# Patient Record
Sex: MALE | Race: WHITE | NOT HISPANIC OR LATINO | Employment: OTHER | URBAN - METROPOLITAN AREA
[De-identification: names, ages, dates, MRNs, and addresses within clinical notes are randomized per-mention and may not be internally consistent; named-entity substitution may affect disease eponyms.]

---

## 2018-10-31 ENCOUNTER — HOSPITAL ENCOUNTER (EMERGENCY)
Facility: HOSPITAL | Age: 83
Discharge: HOME | End: 2018-10-31
Attending: EMERGENCY MEDICINE | Admitting: EMERGENCY MEDICINE

## 2018-10-31 ENCOUNTER — APPOINTMENT (OUTPATIENT)
Dept: GENERAL RADIOLOGY | Facility: HOSPITAL | Age: 83
End: 2018-10-31

## 2018-10-31 VITALS
HEART RATE: 57 BPM | RESPIRATION RATE: 16 BRPM | HEIGHT: 68 IN | DIASTOLIC BLOOD PRESSURE: 70 MMHG | TEMPERATURE: 98 F | OXYGEN SATURATION: 96 % | BODY MASS INDEX: 21.67 KG/M2 | WEIGHT: 143 LBS | SYSTOLIC BLOOD PRESSURE: 152 MMHG

## 2018-10-31 DIAGNOSIS — I10 ELEVATED BLOOD PRESSURE READING WITH DIAGNOSIS OF HYPERTENSION: ICD-10-CM

## 2018-10-31 DIAGNOSIS — S50.311A ABRASION OF RIGHT ELBOW, INITIAL ENCOUNTER: ICD-10-CM

## 2018-10-31 DIAGNOSIS — S70.01XA CONTUSION OF RIGHT HIP, INITIAL ENCOUNTER: ICD-10-CM
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
## 2018-10-31 DIAGNOSIS — S40.011A CONTUSION OF RIGHT SHOULDER, INITIAL ENCOUNTER: ICD-10-CM
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
## 2018-10-31 PROCEDURE — 73552 X-RAY EXAM OF FEMUR 2/>: CPT

## 2018-10-31 PROCEDURE — 99283 EMERGENCY DEPT VISIT LOW MDM: CPT

## 2018-10-31 RX ORDER — LISINOPRIL 5 MG/1
5 TABLET ORAL DAILY
Status: ON HOLD | COMMUNITY
End: 2020-12-16

## 2018-10-31 RX ORDER — TAMSULOSIN HYDROCHLORIDE 0.4 MG/1
1 CAPSULE ORAL NIGHTLY
COMMUNITY

## 2018-10-31 RX ORDER — BACITRACIN, NEOMYCIN, POLYMYXIN B 400; 3.5; 5 [USP'U]/G; MG/G; [USP'U]/G
1 OINTMENT TOPICAL ONCE
Status: COMPLETED | OUTPATIENT
Start: 2018-10-31 | End: 2018-10-31

## 2018-10-31 RX ORDER — CETIRIZINE HYDROCHLORIDE 10 MG/1
10 TABLET ORAL DAILY PRN
COMMUNITY

## 2018-10-31 RX ORDER — ACETAMINOPHEN 500 MG
1000 TABLET ORAL ONCE
Status: COMPLETED | OUTPATIENT
Start: 2018-10-31 | End: 2018-10-31

## 2018-10-31 RX ADMIN — ACETAMINOPHEN 1000 MG: 500 TABLET, FILM COATED ORAL at 09:12

## 2018-10-31 RX ADMIN — BACITRACIN, NEOMYCIN, POLYMYXIN B 1 G: 400; 3.5; 5 OINTMENT TOPICAL at 10:42

## 2020-12-16 ENCOUNTER — APPOINTMENT (OUTPATIENT)
Dept: CT IMAGING | Facility: HOSPITAL | Age: 85
End: 2020-12-16

## 2020-12-16 ENCOUNTER — HOSPITAL ENCOUNTER (INPATIENT)
Facility: HOSPITAL | Age: 85
LOS: 13 days | Discharge: NURSING, REHAB OR LONG-TERM CARE | End: 2020-12-30
Attending: EMERGENCY MEDICINE | Admitting: INTERNAL MEDICINE

## 2020-12-16 ENCOUNTER — APPOINTMENT (OUTPATIENT)
Dept: GENERAL RADIOLOGY | Facility: HOSPITAL | Age: 85
End: 2020-12-16

## 2020-12-16 DIAGNOSIS — G47.01 INSOMNIA DUE TO MEDICAL CONDITION: ICD-10-CM

## 2020-12-16 DIAGNOSIS — N39.0 URINARY TRACT INFECTION WITHOUT HEMATURIA, SITE UNSPECIFIED: ICD-10-CM

## 2020-12-16 DIAGNOSIS — R07.9 CHEST PAIN, UNSPECIFIED TYPE: Primary | ICD-10-CM

## 2020-12-16 LAB
ALBUMIN SERPL-MCNC: 4.2 G/DL (ref 3.5–5.2)
ALBUMIN/GLOB SERPL: 1.7 G/DL
ALP SERPL-CCNC: 95 U/L (ref 39–117)
ALT SERPL W P-5'-P-CCNC: 21 U/L (ref 1–41)
ANION GAP SERPL CALCULATED.3IONS-SCNC: 10 MMOL/L (ref 5–15)
AST SERPL-CCNC: 26 U/L (ref 1–40)
BACTERIA UR QL AUTO: ABNORMAL /HPF
BASOPHILS # BLD AUTO: 0.04 10*3/MM3 (ref 0–0.2)
BASOPHILS NFR BLD AUTO: 0.5 % (ref 0–1.5)
BILIRUB SERPL-MCNC: 0.3 MG/DL (ref 0–1.2)
BILIRUB UR QL STRIP: NEGATIVE
BUN SERPL-MCNC: 31 MG/DL (ref 8–23)
BUN/CREAT SERPL: 27.7 (ref 7–25)
CALCIUM SPEC-SCNC: 9.6 MG/DL (ref 8.2–9.6)
CHLORIDE SERPL-SCNC: 104 MMOL/L (ref 98–107)
CLARITY UR: ABNORMAL
CO2 SERPL-SCNC: 29 MMOL/L (ref 22–29)
COLOR UR: YELLOW
CREAT SERPL-MCNC: 1.12 MG/DL (ref 0.76–1.27)
DEPRECATED RDW RBC AUTO: 47.8 FL (ref 37–54)
EOSINOPHIL # BLD AUTO: 0.16 10*3/MM3 (ref 0–0.4)
EOSINOPHIL NFR BLD AUTO: 1.8 % (ref 0.3–6.2)
ERYTHROCYTE [DISTWIDTH] IN BLOOD BY AUTOMATED COUNT: 13.3 % (ref 12.3–15.4)
GFR SERPL CREATININE-BSD FRML MDRD: 61 ML/MIN/1.73
GLOBULIN UR ELPH-MCNC: 2.5 GM/DL
GLUCOSE SERPL-MCNC: 137 MG/DL (ref 65–99)
GLUCOSE UR STRIP-MCNC: NEGATIVE MG/DL
HCT VFR BLD AUTO: 37 % (ref 37.5–51)
HGB BLD-MCNC: 11.8 G/DL (ref 13–17.7)
HGB UR QL STRIP.AUTO: NEGATIVE
HOLD SPECIMEN: NORMAL
HOLD SPECIMEN: NORMAL
HYALINE CASTS UR QL AUTO: ABNORMAL /LPF
IMM GRANULOCYTES # BLD AUTO: 0.02 10*3/MM3 (ref 0–0.05)
IMM GRANULOCYTES NFR BLD AUTO: 0.2 % (ref 0–0.5)
KETONES UR QL STRIP: NEGATIVE
LEUKOCYTE ESTERASE UR QL STRIP.AUTO: ABNORMAL
LIPASE SERPL-CCNC: 34 U/L (ref 13–60)
LYMPHOCYTES # BLD AUTO: 1.86 10*3/MM3 (ref 0.7–3.1)
LYMPHOCYTES NFR BLD AUTO: 21.1 % (ref 19.6–45.3)
MAGNESIUM SERPL-MCNC: 1.9 MG/DL (ref 1.7–2.3)
MCH RBC QN AUTO: 31.1 PG (ref 26.6–33)
MCHC RBC AUTO-ENTMCNC: 31.9 G/DL (ref 31.5–35.7)
MCV RBC AUTO: 97.4 FL (ref 79–97)
MONOCYTES # BLD AUTO: 0.72 10*3/MM3 (ref 0.1–0.9)
MONOCYTES NFR BLD AUTO: 8.2 % (ref 5–12)
NEUTROPHILS NFR BLD AUTO: 6.01 10*3/MM3 (ref 1.7–7)
NEUTROPHILS NFR BLD AUTO: 68.2 % (ref 42.7–76)
NITRITE UR QL STRIP: NEGATIVE
NRBC BLD AUTO-RTO: 0 /100 WBC (ref 0–0.2)
NT-PROBNP SERPL-MCNC: 754.4 PG/ML (ref 0–1800)
PH UR STRIP.AUTO: 8 [PH] (ref 5–8)
PLATELET # BLD AUTO: 233 10*3/MM3 (ref 140–450)
PMV BLD AUTO: 9.8 FL (ref 6–12)
POTASSIUM SERPL-SCNC: 4.2 MMOL/L (ref 3.5–5.2)
PROT SERPL-MCNC: 6.7 G/DL (ref 6–8.5)
PROT UR QL STRIP: ABNORMAL
QT INTERVAL: 442 MS
QT INTERVAL: 456 MS
QTC INTERVAL: 480 MS
QTC INTERVAL: 481 MS
RBC # BLD AUTO: 3.8 10*6/MM3 (ref 4.14–5.8)
RBC # UR: ABNORMAL /HPF
REF LAB TEST METHOD: ABNORMAL
SODIUM SERPL-SCNC: 143 MMOL/L (ref 136–145)
SP GR UR STRIP: 1.02 (ref 1–1.03)
SQUAMOUS #/AREA URNS HPF: ABNORMAL /HPF
TROPONIN T SERPL-MCNC: 0.08 NG/ML (ref 0–0.03)
TROPONIN T SERPL-MCNC: 0.09 NG/ML (ref 0–0.03)
UROBILINOGEN UR QL STRIP: ABNORMAL
WBC # BLD AUTO: 8.81 10*3/MM3 (ref 3.4–10.8)
WBC UR QL AUTO: ABNORMAL /HPF
WHOLE BLOOD HOLD SPECIMEN: NORMAL
WHOLE BLOOD HOLD SPECIMEN: NORMAL

## 2020-12-16 PROCEDURE — G0378 HOSPITAL OBSERVATION PER HR: HCPCS

## 2020-12-16 PROCEDURE — 99220 PR INITIAL OBSERVATION CARE/DAY 70 MINUTES: CPT | Performed by: INTERNAL MEDICINE

## 2020-12-16 PROCEDURE — 87186 SC STD MICRODIL/AGAR DIL: CPT | Performed by: NURSE PRACTITIONER

## 2020-12-16 PROCEDURE — 99285 EMERGENCY DEPT VISIT HI MDM: CPT

## 2020-12-16 PROCEDURE — 85025 COMPLETE CBC W/AUTO DIFF WBC: CPT | Performed by: EMERGENCY MEDICINE

## 2020-12-16 PROCEDURE — 80053 COMPREHEN METABOLIC PANEL: CPT | Performed by: EMERGENCY MEDICINE

## 2020-12-16 PROCEDURE — 83690 ASSAY OF LIPASE: CPT | Performed by: EMERGENCY MEDICINE

## 2020-12-16 PROCEDURE — 84484 ASSAY OF TROPONIN QUANT: CPT | Performed by: EMERGENCY MEDICINE

## 2020-12-16 PROCEDURE — 93005 ELECTROCARDIOGRAM TRACING: CPT

## 2020-12-16 PROCEDURE — 51798 US URINE CAPACITY MEASURE: CPT

## 2020-12-16 PROCEDURE — 83036 HEMOGLOBIN GLYCOSYLATED A1C: CPT | Performed by: NURSE PRACTITIONER

## 2020-12-16 PROCEDURE — 81001 URINALYSIS AUTO W/SCOPE: CPT | Performed by: NURSE PRACTITIONER

## 2020-12-16 PROCEDURE — 83880 ASSAY OF NATRIURETIC PEPTIDE: CPT | Performed by: EMERGENCY MEDICINE

## 2020-12-16 PROCEDURE — 84443 ASSAY THYROID STIM HORMONE: CPT | Performed by: NURSE PRACTITIONER

## 2020-12-16 PROCEDURE — 71045 X-RAY EXAM CHEST 1 VIEW: CPT

## 2020-12-16 PROCEDURE — 83735 ASSAY OF MAGNESIUM: CPT | Performed by: NURSE PRACTITIONER

## 2020-12-16 PROCEDURE — 87086 URINE CULTURE/COLONY COUNT: CPT | Performed by: NURSE PRACTITIONER

## 2020-12-16 PROCEDURE — 25010000002 CEFTRIAXONE PER 250 MG: Performed by: NURSE PRACTITIONER

## 2020-12-16 PROCEDURE — U0004 COV-19 TEST NON-CDC HGH THRU: HCPCS | Performed by: INTERNAL MEDICINE

## 2020-12-16 PROCEDURE — 0 IOPAMIDOL PER 1 ML: Performed by: EMERGENCY MEDICINE

## 2020-12-16 PROCEDURE — 93005 ELECTROCARDIOGRAM TRACING: CPT | Performed by: EMERGENCY MEDICINE

## 2020-12-16 PROCEDURE — 71275 CT ANGIOGRAPHY CHEST: CPT

## 2020-12-16 RX ORDER — ACETAMINOPHEN 160 MG/5ML
650 SOLUTION ORAL EVERY 4 HOURS PRN
Status: DISCONTINUED | OUTPATIENT
Start: 2020-12-16 | End: 2020-12-30 | Stop reason: HOSPADM

## 2020-12-16 RX ORDER — DOCUSATE SODIUM 100 MG/1
100 CAPSULE, LIQUID FILLED ORAL 2 TIMES DAILY PRN
Status: DISCONTINUED | OUTPATIENT
Start: 2020-12-16 | End: 2020-12-30 | Stop reason: HOSPADM

## 2020-12-16 RX ORDER — CETIRIZINE HYDROCHLORIDE 10 MG/1
10 TABLET ORAL DAILY PRN
Status: DISCONTINUED | OUTPATIENT
Start: 2020-12-16 | End: 2020-12-30 | Stop reason: HOSPADM

## 2020-12-16 RX ORDER — DEXTROSE MONOHYDRATE 25 G/50ML
25 INJECTION, SOLUTION INTRAVENOUS
Status: DISCONTINUED | OUTPATIENT
Start: 2020-12-16 | End: 2020-12-30 | Stop reason: HOSPADM

## 2020-12-16 RX ORDER — HEPARIN SODIUM 5000 [USP'U]/ML
5000 INJECTION, SOLUTION INTRAVENOUS; SUBCUTANEOUS EVERY 8 HOURS SCHEDULED
Status: DISCONTINUED | OUTPATIENT
Start: 2020-12-17 | End: 2020-12-27

## 2020-12-16 RX ORDER — METFORMIN HYDROCHLORIDE 500 MG/1
500 TABLET, EXTENDED RELEASE ORAL
COMMUNITY

## 2020-12-16 RX ORDER — TAMSULOSIN HYDROCHLORIDE 0.4 MG/1
0.4 CAPSULE ORAL NIGHTLY
Status: DISCONTINUED | OUTPATIENT
Start: 2020-12-16 | End: 2020-12-27

## 2020-12-16 RX ORDER — NICOTINE POLACRILEX 4 MG
15 LOZENGE BUCCAL
Status: DISCONTINUED | OUTPATIENT
Start: 2020-12-16 | End: 2020-12-30 | Stop reason: HOSPADM

## 2020-12-16 RX ORDER — SODIUM CHLORIDE 0.9 % (FLUSH) 0.9 %
10 SYRINGE (ML) INJECTION AS NEEDED
Status: DISCONTINUED | OUTPATIENT
Start: 2020-12-16 | End: 2020-12-30 | Stop reason: HOSPADM

## 2020-12-16 RX ORDER — ACETAMINOPHEN 650 MG/1
650 SUPPOSITORY RECTAL EVERY 4 HOURS PRN
Status: DISCONTINUED | OUTPATIENT
Start: 2020-12-16 | End: 2020-12-30 | Stop reason: HOSPADM

## 2020-12-16 RX ORDER — ACETAMINOPHEN 325 MG/1
650 TABLET ORAL EVERY 4 HOURS PRN
Status: DISCONTINUED | OUTPATIENT
Start: 2020-12-16 | End: 2020-12-30 | Stop reason: HOSPADM

## 2020-12-16 RX ORDER — LISINOPRIL 5 MG/1
5 TABLET ORAL DAILY
Status: DISCONTINUED | OUTPATIENT
Start: 2020-12-16 | End: 2020-12-30 | Stop reason: HOSPADM

## 2020-12-16 RX ORDER — ASPIRIN 81 MG/1
324 TABLET, CHEWABLE ORAL ONCE
Status: COMPLETED | OUTPATIENT
Start: 2020-12-16 | End: 2020-12-16

## 2020-12-16 RX ORDER — SODIUM CHLORIDE 0.9 % (FLUSH) 0.9 %
10 SYRINGE (ML) INJECTION EVERY 12 HOURS SCHEDULED
Status: DISCONTINUED | OUTPATIENT
Start: 2020-12-17 | End: 2020-12-30 | Stop reason: HOSPADM

## 2020-12-16 RX ADMIN — SODIUM CHLORIDE 500 ML: 9 INJECTION, SOLUTION INTRAVENOUS at 14:54

## 2020-12-16 RX ADMIN — TAMSULOSIN HYDROCHLORIDE 0.4 MG: 0.4 CAPSULE ORAL at 23:01

## 2020-12-16 RX ADMIN — CETIRIZINE HYDROCHLORIDE 10 MG: 10 TABLET, FILM COATED ORAL at 18:55

## 2020-12-16 RX ADMIN — LISINOPRIL 5 MG: 5 TABLET ORAL at 18:55

## 2020-12-16 RX ADMIN — ASPIRIN 81 MG CHEWABLE TABLET 324 MG: 81 TABLET CHEWABLE at 14:56

## 2020-12-16 RX ADMIN — IOPAMIDOL 60 ML: 755 INJECTION, SOLUTION INTRAVENOUS at 16:13

## 2020-12-16 RX ADMIN — SODIUM CHLORIDE 1 G: 900 INJECTION INTRAVENOUS at 15:26

## 2020-12-16 RX ADMIN — SODIUM CHLORIDE, PRESERVATIVE FREE 10 ML: 5 INJECTION INTRAVENOUS at 23:46

## 2020-12-17 ENCOUNTER — APPOINTMENT (OUTPATIENT)
Dept: CARDIOLOGY | Facility: HOSPITAL | Age: 85
End: 2020-12-17

## 2020-12-17 PROBLEM — I44.7 LBBB (LEFT BUNDLE BRANCH BLOCK): Status: ACTIVE | Noted: 2020-12-17

## 2020-12-17 PROBLEM — I21.4 NSTEMI (NON-ST ELEVATED MYOCARDIAL INFARCTION) (HCC): Status: ACTIVE | Noted: 2020-12-17

## 2020-12-17 PROBLEM — G93.41 METABOLIC ENCEPHALOPATHY: Status: ACTIVE | Noted: 2020-12-17

## 2020-12-17 LAB
ALBUMIN SERPL-MCNC: 3.6 G/DL (ref 3.5–5.2)
ALBUMIN/GLOB SERPL: 1.2 G/DL
ALP SERPL-CCNC: 89 U/L (ref 39–117)
ALT SERPL W P-5'-P-CCNC: 21 U/L (ref 1–41)
ANION GAP SERPL CALCULATED.3IONS-SCNC: 13 MMOL/L (ref 5–15)
ASCENDING AORTA: 3.3 CM
AST SERPL-CCNC: 28 U/L (ref 1–40)
BASOPHILS # BLD AUTO: 0.04 10*3/MM3 (ref 0–0.2)
BASOPHILS NFR BLD AUTO: 0.4 % (ref 0–1.5)
BH CV ECHO MEAS - AI DEC SLOPE: 165.9 CM/SEC^2
BH CV ECHO MEAS - AI MAX PG: 71.7 MMHG
BH CV ECHO MEAS - AI MAX VEL: 423.5 CM/SEC
BH CV ECHO MEAS - AI P1/2T: 747.8 MSEC
BH CV ECHO MEAS - AO MAX PG (FULL): 4.9 MMHG
BH CV ECHO MEAS - AO MAX PG: 7 MMHG
BH CV ECHO MEAS - AO MEAN PG (FULL): 3.2 MMHG
BH CV ECHO MEAS - AO MEAN PG: 4.2 MMHG
BH CV ECHO MEAS - AO ROOT AREA (BSA CORRECTED): 1.9
BH CV ECHO MEAS - AO ROOT AREA: 8.8 CM^2
BH CV ECHO MEAS - AO ROOT DIAM: 3.4 CM
BH CV ECHO MEAS - AO V2 MAX: 133.2 CM/SEC
BH CV ECHO MEAS - AO V2 MEAN: 97.3 CM/SEC
BH CV ECHO MEAS - AO V2 VTI: 31.2 CM
BH CV ECHO MEAS - ASC AORTA: 3.3 CM
BH CV ECHO MEAS - AVA(I,A): 1.4 CM^2
BH CV ECHO MEAS - AVA(I,D): 1.4 CM^2
BH CV ECHO MEAS - AVA(V,A): 1.7 CM^2
BH CV ECHO MEAS - AVA(V,D): 1.7 CM^2
BH CV ECHO MEAS - BSA(HAYCOCK): 1.8 M^2
BH CV ECHO MEAS - BSA: 1.8 M^2
BH CV ECHO MEAS - BZI_BMI: 21.7 KILOGRAMS/M^2
BH CV ECHO MEAS - BZI_METRIC_HEIGHT: 172.7 CM
BH CV ECHO MEAS - BZI_METRIC_WEIGHT: 64.9 KG
BH CV ECHO MEAS - EDV(CUBED): 82.4 ML
BH CV ECHO MEAS - EDV(MOD-SP2): 72 ML
BH CV ECHO MEAS - EDV(MOD-SP4): 99.7 ML
BH CV ECHO MEAS - EDV(TEICH): 85.4 ML
BH CV ECHO MEAS - EF(CUBED): 59.8 %
BH CV ECHO MEAS - EF(MOD-BP): 49.7 %
BH CV ECHO MEAS - EF(MOD-SP2): 51.7 %
BH CV ECHO MEAS - EF(MOD-SP4): 51.4 %
BH CV ECHO MEAS - EF(TEICH): 51.6 %
BH CV ECHO MEAS - ESV(CUBED): 33.1 ML
BH CV ECHO MEAS - ESV(MOD-SP2): 34.8 ML
BH CV ECHO MEAS - ESV(MOD-SP4): 48.5 ML
BH CV ECHO MEAS - ESV(TEICH): 41.3 ML
BH CV ECHO MEAS - FS: 26.2 %
BH CV ECHO MEAS - IVS/LVPW: 0.8
BH CV ECHO MEAS - IVSD: 1 CM
BH CV ECHO MEAS - LA DIMENSION: 2.9 CM
BH CV ECHO MEAS - LA/AO: 0.86
BH CV ECHO MEAS - LAD MAJOR: 6.3 CM
BH CV ECHO MEAS - LAT PEAK E' VEL: 6.2 CM/SEC
BH CV ECHO MEAS - LATERAL E/E' RATIO: 13.3
BH CV ECHO MEAS - LV DIASTOLIC VOL/BSA (35-75): 56.3 ML/M^2
BH CV ECHO MEAS - LV IVRT: 0.1 SEC
BH CV ECHO MEAS - LV MASS(C)D: 176.5 GRAMS
BH CV ECHO MEAS - LV MASS(C)DI: 99.6 GRAMS/M^2
BH CV ECHO MEAS - LV MAX PG: 2.1 MMHG
BH CV ECHO MEAS - LV MEAN PG: 1 MMHG
BH CV ECHO MEAS - LV SYSTOLIC VOL/BSA (12-30): 27.4 ML/M^2
BH CV ECHO MEAS - LV V1 MAX: 72.2 CM/SEC
BH CV ECHO MEAS - LV V1 MEAN: 46.1 CM/SEC
BH CV ECHO MEAS - LV V1 VTI: 14.6 CM
BH CV ECHO MEAS - LVIDD: 4.4 CM
BH CV ECHO MEAS - LVIDS: 3.2 CM
BH CV ECHO MEAS - LVLD AP2: 7.2 CM
BH CV ECHO MEAS - LVLD AP4: 8.2 CM
BH CV ECHO MEAS - LVLS AP2: 5.9 CM
BH CV ECHO MEAS - LVLS AP4: 7.3 CM
BH CV ECHO MEAS - LVOT AREA (M): 3.1 CM^2
BH CV ECHO MEAS - LVOT AREA: 3.1 CM^2
BH CV ECHO MEAS - LVOT DIAM: 2 CM
BH CV ECHO MEAS - LVPWD: 1.3 CM
BH CV ECHO MEAS - MED PEAK E' VEL: 4.5 CM/SEC
BH CV ECHO MEAS - MEDIAL E/E' RATIO: 18.5
BH CV ECHO MEAS - MV A MAX VEL: 107.8 CM/SEC
BH CV ECHO MEAS - MV DEC SLOPE: 367.1 CM/SEC^2
BH CV ECHO MEAS - MV DEC TIME: 0.2 SEC
BH CV ECHO MEAS - MV E MAX VEL: 82.6 CM/SEC
BH CV ECHO MEAS - MV E/A: 0.77
BH CV ECHO MEAS - MV P1/2T MAX VEL: 80.1 CM/SEC
BH CV ECHO MEAS - MV P1/2T: 63.9 MSEC
BH CV ECHO MEAS - MVA P1/2T LCG: 2.7 CM^2
BH CV ECHO MEAS - MVA(P1/2T): 3.4 CM^2
BH CV ECHO MEAS - PA ACC TIME: 0.13 SEC
BH CV ECHO MEAS - PA MAX PG: 3.3 MMHG
BH CV ECHO MEAS - PA PR(ACCEL): 22 MMHG
BH CV ECHO MEAS - PA V2 MAX: 91.1 CM/SEC
BH CV ECHO MEAS - PI END-D VEL: 120.8 CM/SEC
BH CV ECHO MEAS - RAP SYSTOLE: 8 MMHG
BH CV ECHO MEAS - RVSP: 33 MMHG
BH CV ECHO MEAS - SI(AO): 155.1 ML/M^2
BH CV ECHO MEAS - SI(CUBED): 27.8 ML/M^2
BH CV ECHO MEAS - SI(LVOT): 25.4 ML/M^2
BH CV ECHO MEAS - SI(MOD-SP2): 21 ML/M^2
BH CV ECHO MEAS - SI(MOD-SP4): 28.9 ML/M^2
BH CV ECHO MEAS - SI(TEICH): 24.9 ML/M^2
BH CV ECHO MEAS - SV(AO): 274.8 ML
BH CV ECHO MEAS - SV(CUBED): 49.3 ML
BH CV ECHO MEAS - SV(LVOT): 45 ML
BH CV ECHO MEAS - SV(MOD-SP2): 37.2 ML
BH CV ECHO MEAS - SV(MOD-SP4): 51.2 ML
BH CV ECHO MEAS - SV(TEICH): 44.1 ML
BH CV ECHO MEAS - TAPSE (>1.6): 2.3 CM
BH CV ECHO MEAS - TR MAX PG: 25 MMHG
BH CV ECHO MEAS - TR MAX VEL: 248 CM/SEC
BH CV ECHO MEASUREMENTS AVERAGE E/E' RATIO: 15.44
BH CV VAS BP LEFT ARM: NORMAL MMHG
BH CV XLRA - RV BASE: 3.7 CM
BH CV XLRA - RV LENGTH: 6.6 CM
BH CV XLRA - RV MID: 2.4 CM
BH CV XLRA - TDI S': 11.6 CM/SEC
BILIRUB SERPL-MCNC: 0.4 MG/DL (ref 0–1.2)
BUN SERPL-MCNC: 25 MG/DL (ref 8–23)
BUN/CREAT SERPL: 24.5 (ref 7–25)
CALCIUM SPEC-SCNC: 9.1 MG/DL (ref 8.2–9.6)
CHLORIDE SERPL-SCNC: 107 MMOL/L (ref 98–107)
CHOLEST SERPL-MCNC: 174 MG/DL (ref 0–200)
CO2 SERPL-SCNC: 21 MMOL/L (ref 22–29)
CREAT SERPL-MCNC: 1.02 MG/DL (ref 0.76–1.27)
DEPRECATED RDW RBC AUTO: 48 FL (ref 37–54)
EOSINOPHIL # BLD AUTO: 0.12 10*3/MM3 (ref 0–0.4)
EOSINOPHIL NFR BLD AUTO: 1.3 % (ref 0.3–6.2)
ERYTHROCYTE [DISTWIDTH] IN BLOOD BY AUTOMATED COUNT: 13.2 % (ref 12.3–15.4)
GFR SERPL CREATININE-BSD FRML MDRD: 68 ML/MIN/1.73
GLOBULIN UR ELPH-MCNC: 2.9 GM/DL
GLUCOSE BLDC GLUCOMTR-MCNC: 102 MG/DL (ref 70–130)
GLUCOSE BLDC GLUCOMTR-MCNC: 109 MG/DL (ref 70–130)
GLUCOSE BLDC GLUCOMTR-MCNC: 113 MG/DL (ref 70–130)
GLUCOSE BLDC GLUCOMTR-MCNC: 116 MG/DL (ref 70–130)
GLUCOSE SERPL-MCNC: 112 MG/DL (ref 65–99)
HBA1C MFR BLD: 6.2 % (ref 4.8–5.6)
HCT VFR BLD AUTO: 39.5 % (ref 37.5–51)
HDLC SERPL-MCNC: 66 MG/DL (ref 40–60)
HGB BLD-MCNC: 12 G/DL (ref 13–17.7)
IMM GRANULOCYTES # BLD AUTO: 0.03 10*3/MM3 (ref 0–0.05)
IMM GRANULOCYTES NFR BLD AUTO: 0.3 % (ref 0–0.5)
LDLC SERPL CALC-MCNC: 96 MG/DL (ref 0–100)
LDLC/HDLC SERPL: 1.45 {RATIO}
LEFT ATRIUM VOLUME INDEX: 24 ML/M^2
LEFT ATRIUM VOLUME: 42.5 ML
LYMPHOCYTES # BLD AUTO: 2.03 10*3/MM3 (ref 0.7–3.1)
LYMPHOCYTES NFR BLD AUTO: 22.5 % (ref 19.6–45.3)
MCH RBC QN AUTO: 29.9 PG (ref 26.6–33)
MCHC RBC AUTO-ENTMCNC: 30.4 G/DL (ref 31.5–35.7)
MCV RBC AUTO: 98.5 FL (ref 79–97)
MONOCYTES # BLD AUTO: 0.84 10*3/MM3 (ref 0.1–0.9)
MONOCYTES NFR BLD AUTO: 9.3 % (ref 5–12)
NEUTROPHILS NFR BLD AUTO: 5.96 10*3/MM3 (ref 1.7–7)
NEUTROPHILS NFR BLD AUTO: 66.2 % (ref 42.7–76)
NRBC BLD AUTO-RTO: 0 /100 WBC (ref 0–0.2)
PLATELET # BLD AUTO: 213 10*3/MM3 (ref 140–450)
PMV BLD AUTO: 9.9 FL (ref 6–12)
POTASSIUM SERPL-SCNC: 4 MMOL/L (ref 3.5–5.2)
PROT SERPL-MCNC: 6.5 G/DL (ref 6–8.5)
RBC # BLD AUTO: 4.01 10*6/MM3 (ref 4.14–5.8)
SARS-COV-2 RNA RESP QL NAA+PROBE: NOT DETECTED
SODIUM SERPL-SCNC: 141 MMOL/L (ref 136–145)
T4 FREE SERPL-MCNC: 0.98 NG/DL (ref 0.93–1.7)
TRIGL SERPL-MCNC: 60 MG/DL (ref 0–150)
TROPONIN T SERPL-MCNC: 0.08 NG/ML (ref 0–0.03)
TROPONIN T SERPL-MCNC: 0.1 NG/ML (ref 0–0.03)
TSH SERPL DL<=0.05 MIU/L-ACNC: 5.88 UIU/ML (ref 0.27–4.2)
TSH SERPL DL<=0.05 MIU/L-ACNC: 5.98 UIU/ML (ref 0.27–4.2)
VLDLC SERPL-MCNC: 12 MG/DL (ref 5–40)
WBC # BLD AUTO: 9.02 10*3/MM3 (ref 3.4–10.8)

## 2020-12-17 PROCEDURE — 82962 GLUCOSE BLOOD TEST: CPT

## 2020-12-17 PROCEDURE — 93306 TTE W/DOPPLER COMPLETE: CPT

## 2020-12-17 PROCEDURE — 93005 ELECTROCARDIOGRAM TRACING: CPT | Performed by: FAMILY MEDICINE

## 2020-12-17 PROCEDURE — 80061 LIPID PANEL: CPT | Performed by: NURSE PRACTITIONER

## 2020-12-17 PROCEDURE — 80053 COMPREHEN METABOLIC PANEL: CPT | Performed by: NURSE PRACTITIONER

## 2020-12-17 PROCEDURE — 84484 ASSAY OF TROPONIN QUANT: CPT | Performed by: NURSE PRACTITIONER

## 2020-12-17 PROCEDURE — 25010000002 LORAZEPAM PER 2 MG: Performed by: FAMILY MEDICINE

## 2020-12-17 PROCEDURE — 25010000002 HEPARIN (PORCINE) PER 1000 UNITS: Performed by: NURSE PRACTITIONER

## 2020-12-17 PROCEDURE — 25010000002 CEFTRIAXONE PER 250 MG: Performed by: NURSE PRACTITIONER

## 2020-12-17 PROCEDURE — 85025 COMPLETE CBC W/AUTO DIFF WBC: CPT | Performed by: NURSE PRACTITIONER

## 2020-12-17 PROCEDURE — 97166 OT EVAL MOD COMPLEX 45 MIN: CPT

## 2020-12-17 PROCEDURE — 93306 TTE W/DOPPLER COMPLETE: CPT | Performed by: INTERNAL MEDICINE

## 2020-12-17 PROCEDURE — 97162 PT EVAL MOD COMPLEX 30 MIN: CPT

## 2020-12-17 PROCEDURE — 99233 SBSQ HOSP IP/OBS HIGH 50: CPT | Performed by: FAMILY MEDICINE

## 2020-12-17 PROCEDURE — 84443 ASSAY THYROID STIM HORMONE: CPT | Performed by: NURSE PRACTITIONER

## 2020-12-17 PROCEDURE — 84439 ASSAY OF FREE THYROXINE: CPT | Performed by: NURSE PRACTITIONER

## 2020-12-17 RX ORDER — QUETIAPINE FUMARATE 25 MG/1
25 TABLET, FILM COATED ORAL ONCE AS NEEDED
Status: COMPLETED | OUTPATIENT
Start: 2020-12-17 | End: 2020-12-17

## 2020-12-17 RX ORDER — LORAZEPAM 2 MG/ML
1 INJECTION INTRAMUSCULAR EVERY 4 HOURS PRN
Status: DISCONTINUED | OUTPATIENT
Start: 2020-12-17 | End: 2020-12-21

## 2020-12-17 RX ORDER — NITROGLYCERIN 0.4 MG/1
0.4 TABLET SUBLINGUAL
Status: DISCONTINUED | OUTPATIENT
Start: 2020-12-17 | End: 2020-12-30 | Stop reason: HOSPADM

## 2020-12-17 RX ORDER — LORAZEPAM 2 MG/ML
2 INJECTION INTRAMUSCULAR ONCE AS NEEDED
Status: COMPLETED | OUTPATIENT
Start: 2020-12-17 | End: 2020-12-17

## 2020-12-17 RX ORDER — ATORVASTATIN CALCIUM 20 MG/1
20 TABLET, FILM COATED ORAL NIGHTLY
Status: DISCONTINUED | OUTPATIENT
Start: 2020-12-17 | End: 2020-12-30 | Stop reason: HOSPADM

## 2020-12-17 RX ADMIN — HEPARIN SODIUM 5000 UNITS: 5000 INJECTION INTRAVENOUS; SUBCUTANEOUS at 13:22

## 2020-12-17 RX ADMIN — LORAZEPAM 1 MG: 2 INJECTION INTRAMUSCULAR; INTRAVENOUS at 13:37

## 2020-12-17 RX ADMIN — SODIUM CHLORIDE, PRESERVATIVE FREE 10 ML: 5 INJECTION INTRAVENOUS at 21:16

## 2020-12-17 RX ADMIN — HEPARIN SODIUM 5000 UNITS: 5000 INJECTION INTRAVENOUS; SUBCUTANEOUS at 05:29

## 2020-12-17 RX ADMIN — LORAZEPAM 2 MG: 2 INJECTION INTRAMUSCULAR; INTRAVENOUS at 14:35

## 2020-12-17 RX ADMIN — ATORVASTATIN CALCIUM 20 MG: 20 TABLET, FILM COATED ORAL at 21:15

## 2020-12-17 RX ADMIN — CEFTRIAXONE 1 G: 1 INJECTION, POWDER, FOR SOLUTION INTRAMUSCULAR; INTRAVENOUS at 16:26

## 2020-12-17 RX ADMIN — LISINOPRIL 5 MG: 5 TABLET ORAL at 08:55

## 2020-12-17 RX ADMIN — TAMSULOSIN HYDROCHLORIDE 0.4 MG: 0.4 CAPSULE ORAL at 21:15

## 2020-12-17 RX ADMIN — METOPROLOL TARTRATE 12.5 MG: 25 TABLET, FILM COATED ORAL at 21:15

## 2020-12-17 RX ADMIN — SODIUM CHLORIDE, PRESERVATIVE FREE 10 ML: 5 INJECTION INTRAVENOUS at 08:55

## 2020-12-17 RX ADMIN — QUETIAPINE FUMARATE 25 MG: 25 TABLET ORAL at 00:53

## 2020-12-17 RX ADMIN — METOPROLOL TARTRATE 12.5 MG: 25 TABLET, FILM COATED ORAL at 13:21

## 2020-12-17 RX ADMIN — HEPARIN SODIUM 5000 UNITS: 5000 INJECTION INTRAVENOUS; SUBCUTANEOUS at 21:15

## 2020-12-18 PROBLEM — I50.23 ACUTE ON CHRONIC SYSTOLIC CHF (CONGESTIVE HEART FAILURE) (HCC): Status: ACTIVE | Noted: 2020-12-18

## 2020-12-18 LAB
ANION GAP SERPL CALCULATED.3IONS-SCNC: 11 MMOL/L (ref 5–15)
BASOPHILS # BLD AUTO: 0.04 10*3/MM3 (ref 0–0.2)
BASOPHILS NFR BLD AUTO: 0.5 % (ref 0–1.5)
BUN SERPL-MCNC: 22 MG/DL (ref 8–23)
BUN/CREAT SERPL: 23.7 (ref 7–25)
CALCIUM SPEC-SCNC: 8.6 MG/DL (ref 8.2–9.6)
CHLORIDE SERPL-SCNC: 106 MMOL/L (ref 98–107)
CO2 SERPL-SCNC: 24 MMOL/L (ref 22–29)
CREAT SERPL-MCNC: 0.93 MG/DL (ref 0.76–1.27)
DEPRECATED RDW RBC AUTO: 47.3 FL (ref 37–54)
EOSINOPHIL # BLD AUTO: 0.18 10*3/MM3 (ref 0–0.4)
EOSINOPHIL NFR BLD AUTO: 2.3 % (ref 0.3–6.2)
ERYTHROCYTE [DISTWIDTH] IN BLOOD BY AUTOMATED COUNT: 13.3 % (ref 12.3–15.4)
GFR SERPL CREATININE-BSD FRML MDRD: 75 ML/MIN/1.73
GLUCOSE BLDC GLUCOMTR-MCNC: 101 MG/DL (ref 70–130)
GLUCOSE BLDC GLUCOMTR-MCNC: 108 MG/DL (ref 70–130)
GLUCOSE BLDC GLUCOMTR-MCNC: 87 MG/DL (ref 70–130)
GLUCOSE BLDC GLUCOMTR-MCNC: 95 MG/DL (ref 70–130)
GLUCOSE SERPL-MCNC: 95 MG/DL (ref 65–99)
HCT VFR BLD AUTO: 38 % (ref 37.5–51)
HGB BLD-MCNC: 12 G/DL (ref 13–17.7)
IMM GRANULOCYTES # BLD AUTO: 0.02 10*3/MM3 (ref 0–0.05)
IMM GRANULOCYTES NFR BLD AUTO: 0.3 % (ref 0–0.5)
LYMPHOCYTES # BLD AUTO: 1.52 10*3/MM3 (ref 0.7–3.1)
LYMPHOCYTES NFR BLD AUTO: 19.4 % (ref 19.6–45.3)
MCH RBC QN AUTO: 30.6 PG (ref 26.6–33)
MCHC RBC AUTO-ENTMCNC: 31.6 G/DL (ref 31.5–35.7)
MCV RBC AUTO: 96.9 FL (ref 79–97)
MONOCYTES # BLD AUTO: 0.7 10*3/MM3 (ref 0.1–0.9)
MONOCYTES NFR BLD AUTO: 8.9 % (ref 5–12)
NEUTROPHILS NFR BLD AUTO: 5.38 10*3/MM3 (ref 1.7–7)
NEUTROPHILS NFR BLD AUTO: 68.6 % (ref 42.7–76)
NRBC BLD AUTO-RTO: 0 /100 WBC (ref 0–0.2)
PLATELET # BLD AUTO: 211 10*3/MM3 (ref 140–450)
PMV BLD AUTO: 9.6 FL (ref 6–12)
POTASSIUM SERPL-SCNC: 3.8 MMOL/L (ref 3.5–5.2)
QT INTERVAL: 452 MS
QTC INTERVAL: 508 MS
RBC # BLD AUTO: 3.92 10*6/MM3 (ref 4.14–5.8)
SODIUM SERPL-SCNC: 141 MMOL/L (ref 136–145)
WBC # BLD AUTO: 7.84 10*3/MM3 (ref 3.4–10.8)

## 2020-12-18 PROCEDURE — 99232 SBSQ HOSP IP/OBS MODERATE 35: CPT | Performed by: FAMILY MEDICINE

## 2020-12-18 PROCEDURE — 80048 BASIC METABOLIC PNL TOTAL CA: CPT | Performed by: FAMILY MEDICINE

## 2020-12-18 PROCEDURE — 85025 COMPLETE CBC W/AUTO DIFF WBC: CPT | Performed by: FAMILY MEDICINE

## 2020-12-18 PROCEDURE — 25010000002 PIPERACILLIN SOD-TAZOBACTAM PER 1 G: Performed by: FAMILY MEDICINE

## 2020-12-18 PROCEDURE — 25010000002 LORAZEPAM PER 2 MG: Performed by: FAMILY MEDICINE

## 2020-12-18 PROCEDURE — 82962 GLUCOSE BLOOD TEST: CPT

## 2020-12-18 PROCEDURE — 25010000002 HEPARIN (PORCINE) PER 1000 UNITS: Performed by: NURSE PRACTITIONER

## 2020-12-18 PROCEDURE — 93010 ELECTROCARDIOGRAM REPORT: CPT | Performed by: INTERNAL MEDICINE

## 2020-12-18 RX ORDER — QUETIAPINE FUMARATE 25 MG/1
25 TABLET, FILM COATED ORAL 2 TIMES DAILY PRN
Status: DISCONTINUED | OUTPATIENT
Start: 2020-12-18 | End: 2020-12-30 | Stop reason: HOSPADM

## 2020-12-18 RX ORDER — QUETIAPINE FUMARATE 25 MG/1
25 TABLET, FILM COATED ORAL NIGHTLY
Status: DISCONTINUED | OUTPATIENT
Start: 2020-12-18 | End: 2020-12-19

## 2020-12-18 RX ADMIN — TAMSULOSIN HYDROCHLORIDE 0.4 MG: 0.4 CAPSULE ORAL at 20:47

## 2020-12-18 RX ADMIN — HEPARIN SODIUM 5000 UNITS: 5000 INJECTION INTRAVENOUS; SUBCUTANEOUS at 13:20

## 2020-12-18 RX ADMIN — METOPROLOL TARTRATE 12.5 MG: 25 TABLET, FILM COATED ORAL at 10:19

## 2020-12-18 RX ADMIN — HEPARIN SODIUM 5000 UNITS: 5000 INJECTION INTRAVENOUS; SUBCUTANEOUS at 06:01

## 2020-12-18 RX ADMIN — LORAZEPAM 1 MG: 2 INJECTION INTRAMUSCULAR; INTRAVENOUS at 01:07

## 2020-12-18 RX ADMIN — HEPARIN SODIUM 5000 UNITS: 5000 INJECTION INTRAVENOUS; SUBCUTANEOUS at 20:47

## 2020-12-18 RX ADMIN — SODIUM CHLORIDE, PRESERVATIVE FREE 10 ML: 5 INJECTION INTRAVENOUS at 20:48

## 2020-12-18 RX ADMIN — QUETIAPINE FUMARATE 25 MG: 25 TABLET ORAL at 20:47

## 2020-12-18 RX ADMIN — ATORVASTATIN CALCIUM 20 MG: 20 TABLET, FILM COATED ORAL at 20:47

## 2020-12-18 RX ADMIN — LORAZEPAM 1 MG: 2 INJECTION INTRAMUSCULAR; INTRAVENOUS at 10:19

## 2020-12-18 RX ADMIN — SODIUM CHLORIDE, PRESERVATIVE FREE 10 ML: 5 INJECTION INTRAVENOUS at 10:20

## 2020-12-18 RX ADMIN — METOPROLOL TARTRATE 12.5 MG: 25 TABLET, FILM COATED ORAL at 20:47

## 2020-12-18 RX ADMIN — LORAZEPAM 1 MG: 2 INJECTION INTRAMUSCULAR; INTRAVENOUS at 06:01

## 2020-12-18 RX ADMIN — ACETAMINOPHEN 650 MG: 325 TABLET, FILM COATED ORAL at 20:47

## 2020-12-18 RX ADMIN — TAZOBACTAM SODIUM AND PIPERACILLIN SODIUM 3.38 G: 375; 3 INJECTION, SOLUTION INTRAVENOUS at 13:18

## 2020-12-18 RX ADMIN — TAZOBACTAM SODIUM AND PIPERACILLIN SODIUM 3.38 G: 375; 3 INJECTION, SOLUTION INTRAVENOUS at 18:41

## 2020-12-18 RX ADMIN — LORAZEPAM 1 MG: 2 INJECTION INTRAMUSCULAR; INTRAVENOUS at 21:05

## 2020-12-18 RX ADMIN — LISINOPRIL 5 MG: 5 TABLET ORAL at 10:19

## 2020-12-19 LAB
ANION GAP SERPL CALCULATED.3IONS-SCNC: 13 MMOL/L (ref 5–15)
BACTERIA SPEC AEROBE CULT: ABNORMAL
BASOPHILS # BLD AUTO: 0.04 10*3/MM3 (ref 0–0.2)
BASOPHILS NFR BLD AUTO: 0.6 % (ref 0–1.5)
BUN SERPL-MCNC: 22 MG/DL (ref 8–23)
BUN/CREAT SERPL: 22 (ref 7–25)
CALCIUM SPEC-SCNC: 8.9 MG/DL (ref 8.2–9.6)
CHLORIDE SERPL-SCNC: 105 MMOL/L (ref 98–107)
CO2 SERPL-SCNC: 24 MMOL/L (ref 22–29)
CREAT SERPL-MCNC: 1 MG/DL (ref 0.76–1.27)
DEPRECATED RDW RBC AUTO: 47.8 FL (ref 37–54)
EOSINOPHIL # BLD AUTO: 0.14 10*3/MM3 (ref 0–0.4)
EOSINOPHIL NFR BLD AUTO: 2.2 % (ref 0.3–6.2)
ERYTHROCYTE [DISTWIDTH] IN BLOOD BY AUTOMATED COUNT: 13.4 % (ref 12.3–15.4)
GFR SERPL CREATININE-BSD FRML MDRD: 69 ML/MIN/1.73
GLUCOSE BLDC GLUCOMTR-MCNC: 104 MG/DL (ref 70–130)
GLUCOSE BLDC GLUCOMTR-MCNC: 86 MG/DL (ref 70–130)
GLUCOSE BLDC GLUCOMTR-MCNC: 87 MG/DL (ref 70–130)
GLUCOSE BLDC GLUCOMTR-MCNC: 95 MG/DL (ref 70–130)
GLUCOSE SERPL-MCNC: 102 MG/DL (ref 65–99)
HCT VFR BLD AUTO: 39.5 % (ref 37.5–51)
HGB BLD-MCNC: 12.4 G/DL (ref 13–17.7)
IMM GRANULOCYTES # BLD AUTO: 0.01 10*3/MM3 (ref 0–0.05)
IMM GRANULOCYTES NFR BLD AUTO: 0.2 % (ref 0–0.5)
LYMPHOCYTES # BLD AUTO: 1.43 10*3/MM3 (ref 0.7–3.1)
LYMPHOCYTES NFR BLD AUTO: 22 % (ref 19.6–45.3)
MCH RBC QN AUTO: 30.2 PG (ref 26.6–33)
MCHC RBC AUTO-ENTMCNC: 31.4 G/DL (ref 31.5–35.7)
MCV RBC AUTO: 96.3 FL (ref 79–97)
MONOCYTES # BLD AUTO: 0.58 10*3/MM3 (ref 0.1–0.9)
MONOCYTES NFR BLD AUTO: 8.9 % (ref 5–12)
NEUTROPHILS NFR BLD AUTO: 4.31 10*3/MM3 (ref 1.7–7)
NEUTROPHILS NFR BLD AUTO: 66.1 % (ref 42.7–76)
NRBC BLD AUTO-RTO: 0 /100 WBC (ref 0–0.2)
PLATELET # BLD AUTO: 218 10*3/MM3 (ref 140–450)
PMV BLD AUTO: 9.8 FL (ref 6–12)
POTASSIUM SERPL-SCNC: 3.8 MMOL/L (ref 3.5–5.2)
RBC # BLD AUTO: 4.1 10*6/MM3 (ref 4.14–5.8)
SODIUM SERPL-SCNC: 142 MMOL/L (ref 136–145)
WBC # BLD AUTO: 6.51 10*3/MM3 (ref 3.4–10.8)

## 2020-12-19 PROCEDURE — 25010000002 LORAZEPAM PER 2 MG: Performed by: FAMILY MEDICINE

## 2020-12-19 PROCEDURE — 82962 GLUCOSE BLOOD TEST: CPT

## 2020-12-19 PROCEDURE — 99233 SBSQ HOSP IP/OBS HIGH 50: CPT | Performed by: PHYSICIAN ASSISTANT

## 2020-12-19 PROCEDURE — 25010000002 PIPERACILLIN SOD-TAZOBACTAM PER 1 G: Performed by: FAMILY MEDICINE

## 2020-12-19 PROCEDURE — 80048 BASIC METABOLIC PNL TOTAL CA: CPT | Performed by: FAMILY MEDICINE

## 2020-12-19 PROCEDURE — 25010000002 HEPARIN (PORCINE) PER 1000 UNITS: Performed by: NURSE PRACTITIONER

## 2020-12-19 PROCEDURE — 85025 COMPLETE CBC W/AUTO DIFF WBC: CPT | Performed by: FAMILY MEDICINE

## 2020-12-19 RX ORDER — QUETIAPINE FUMARATE 25 MG/1
50 TABLET, FILM COATED ORAL NIGHTLY
Status: DISCONTINUED | OUTPATIENT
Start: 2020-12-19 | End: 2020-12-20

## 2020-12-19 RX ADMIN — HEPARIN SODIUM 5000 UNITS: 5000 INJECTION INTRAVENOUS; SUBCUTANEOUS at 14:23

## 2020-12-19 RX ADMIN — METOPROLOL TARTRATE 12.5 MG: 25 TABLET, FILM COATED ORAL at 08:51

## 2020-12-19 RX ADMIN — QUETIAPINE FUMARATE 50 MG: 25 TABLET ORAL at 20:18

## 2020-12-19 RX ADMIN — LORAZEPAM 1 MG: 2 INJECTION INTRAMUSCULAR; INTRAVENOUS at 11:18

## 2020-12-19 RX ADMIN — TAZOBACTAM SODIUM AND PIPERACILLIN SODIUM 3.38 G: 375; 3 INJECTION, SOLUTION INTRAVENOUS at 17:33

## 2020-12-19 RX ADMIN — ATORVASTATIN CALCIUM 20 MG: 20 TABLET, FILM COATED ORAL at 20:18

## 2020-12-19 RX ADMIN — TAZOBACTAM SODIUM AND PIPERACILLIN SODIUM 3.38 G: 375; 3 INJECTION, SOLUTION INTRAVENOUS at 01:40

## 2020-12-19 RX ADMIN — SODIUM CHLORIDE, PRESERVATIVE FREE 10 ML: 5 INJECTION INTRAVENOUS at 08:52

## 2020-12-19 RX ADMIN — LISINOPRIL 5 MG: 5 TABLET ORAL at 08:51

## 2020-12-19 RX ADMIN — TAMSULOSIN HYDROCHLORIDE 0.4 MG: 0.4 CAPSULE ORAL at 20:18

## 2020-12-19 RX ADMIN — QUETIAPINE FUMARATE 25 MG: 25 TABLET ORAL at 09:02

## 2020-12-19 RX ADMIN — HEPARIN SODIUM 5000 UNITS: 5000 INJECTION INTRAVENOUS; SUBCUTANEOUS at 20:17

## 2020-12-19 RX ADMIN — HEPARIN SODIUM 5000 UNITS: 5000 INJECTION INTRAVENOUS; SUBCUTANEOUS at 04:55

## 2020-12-19 RX ADMIN — LORAZEPAM 1 MG: 2 INJECTION INTRAMUSCULAR; INTRAVENOUS at 20:24

## 2020-12-19 RX ADMIN — SODIUM CHLORIDE, PRESERVATIVE FREE 10 ML: 5 INJECTION INTRAVENOUS at 20:18

## 2020-12-19 RX ADMIN — TAZOBACTAM SODIUM AND PIPERACILLIN SODIUM 3.38 G: 375; 3 INJECTION, SOLUTION INTRAVENOUS at 11:08

## 2020-12-19 RX ADMIN — METOPROLOL TARTRATE 12.5 MG: 25 TABLET, FILM COATED ORAL at 20:17

## 2020-12-20 LAB
ALBUMIN SERPL-MCNC: 3.5 G/DL (ref 3.5–5.2)
ALBUMIN/GLOB SERPL: 1.3 G/DL
ALP SERPL-CCNC: 86 U/L (ref 39–117)
ALT SERPL W P-5'-P-CCNC: 22 U/L (ref 1–41)
ANION GAP SERPL CALCULATED.3IONS-SCNC: 13 MMOL/L (ref 5–15)
AST SERPL-CCNC: 30 U/L (ref 1–40)
BASOPHILS # BLD AUTO: 0.05 10*3/MM3 (ref 0–0.2)
BASOPHILS NFR BLD AUTO: 0.6 % (ref 0–1.5)
BILIRUB SERPL-MCNC: 0.6 MG/DL (ref 0–1.2)
BUN SERPL-MCNC: 21 MG/DL (ref 8–23)
BUN/CREAT SERPL: 19.3 (ref 7–25)
CALCIUM SPEC-SCNC: 8.7 MG/DL (ref 8.2–9.6)
CHLORIDE SERPL-SCNC: 106 MMOL/L (ref 98–107)
CO2 SERPL-SCNC: 21 MMOL/L (ref 22–29)
CREAT SERPL-MCNC: 1.09 MG/DL (ref 0.76–1.27)
DEPRECATED RDW RBC AUTO: 48.9 FL (ref 37–54)
EOSINOPHIL # BLD AUTO: 0.15 10*3/MM3 (ref 0–0.4)
EOSINOPHIL NFR BLD AUTO: 1.8 % (ref 0.3–6.2)
ERYTHROCYTE [DISTWIDTH] IN BLOOD BY AUTOMATED COUNT: 13.2 % (ref 12.3–15.4)
GFR SERPL CREATININE-BSD FRML MDRD: 63 ML/MIN/1.73
GLOBULIN UR ELPH-MCNC: 2.8 GM/DL
GLUCOSE BLDC GLUCOMTR-MCNC: 81 MG/DL (ref 70–130)
GLUCOSE BLDC GLUCOMTR-MCNC: 88 MG/DL (ref 70–130)
GLUCOSE BLDC GLUCOMTR-MCNC: 91 MG/DL (ref 70–130)
GLUCOSE BLDC GLUCOMTR-MCNC: 99 MG/DL (ref 70–130)
GLUCOSE SERPL-MCNC: 93 MG/DL (ref 65–99)
HCT VFR BLD AUTO: 40.7 % (ref 37.5–51)
HGB BLD-MCNC: 12.3 G/DL (ref 13–17.7)
IMM GRANULOCYTES # BLD AUTO: 0.02 10*3/MM3 (ref 0–0.05)
IMM GRANULOCYTES NFR BLD AUTO: 0.2 % (ref 0–0.5)
LYMPHOCYTES # BLD AUTO: 1.85 10*3/MM3 (ref 0.7–3.1)
LYMPHOCYTES NFR BLD AUTO: 22.8 % (ref 19.6–45.3)
MCH RBC QN AUTO: 30.5 PG (ref 26.6–33)
MCHC RBC AUTO-ENTMCNC: 30.2 G/DL (ref 31.5–35.7)
MCV RBC AUTO: 101 FL (ref 79–97)
MONOCYTES # BLD AUTO: 1.07 10*3/MM3 (ref 0.1–0.9)
MONOCYTES NFR BLD AUTO: 13.2 % (ref 5–12)
NEUTROPHILS NFR BLD AUTO: 4.97 10*3/MM3 (ref 1.7–7)
NEUTROPHILS NFR BLD AUTO: 61.4 % (ref 42.7–76)
NRBC BLD AUTO-RTO: 0 /100 WBC (ref 0–0.2)
PLATELET # BLD AUTO: 197 10*3/MM3 (ref 140–450)
PMV BLD AUTO: 9.4 FL (ref 6–12)
POTASSIUM SERPL-SCNC: 3.6 MMOL/L (ref 3.5–5.2)
PROT SERPL-MCNC: 6.3 G/DL (ref 6–8.5)
RBC # BLD AUTO: 4.03 10*6/MM3 (ref 4.14–5.8)
SODIUM SERPL-SCNC: 140 MMOL/L (ref 136–145)
WBC # BLD AUTO: 8.11 10*3/MM3 (ref 3.4–10.8)

## 2020-12-20 PROCEDURE — 99232 SBSQ HOSP IP/OBS MODERATE 35: CPT | Performed by: NURSE PRACTITIONER

## 2020-12-20 PROCEDURE — 83735 ASSAY OF MAGNESIUM: CPT | Performed by: INTERNAL MEDICINE

## 2020-12-20 PROCEDURE — 25010000002 HEPARIN (PORCINE) PER 1000 UNITS: Performed by: NURSE PRACTITIONER

## 2020-12-20 PROCEDURE — 82962 GLUCOSE BLOOD TEST: CPT

## 2020-12-20 PROCEDURE — 25010000002 LORAZEPAM PER 2 MG: Performed by: FAMILY MEDICINE

## 2020-12-20 PROCEDURE — 85025 COMPLETE CBC W/AUTO DIFF WBC: CPT | Performed by: PHYSICIAN ASSISTANT

## 2020-12-20 PROCEDURE — 25010000002 PIPERACILLIN SOD-TAZOBACTAM PER 1 G: Performed by: FAMILY MEDICINE

## 2020-12-20 PROCEDURE — 84132 ASSAY OF SERUM POTASSIUM: CPT | Performed by: FAMILY MEDICINE

## 2020-12-20 PROCEDURE — 80053 COMPREHEN METABOLIC PANEL: CPT | Performed by: PHYSICIAN ASSISTANT

## 2020-12-20 RX ORDER — POTASSIUM CHLORIDE 1.5 G/1.77G
40 POWDER, FOR SOLUTION ORAL AS NEEDED
Status: DISCONTINUED | OUTPATIENT
Start: 2020-12-20 | End: 2020-12-30 | Stop reason: HOSPADM

## 2020-12-20 RX ORDER — POTASSIUM CHLORIDE 750 MG/1
40 CAPSULE, EXTENDED RELEASE ORAL AS NEEDED
Status: DISCONTINUED | OUTPATIENT
Start: 2020-12-20 | End: 2020-12-30 | Stop reason: HOSPADM

## 2020-12-20 RX ORDER — QUETIAPINE FUMARATE 25 MG/1
75 TABLET, FILM COATED ORAL NIGHTLY
Status: DISCONTINUED | OUTPATIENT
Start: 2020-12-20 | End: 2020-12-22

## 2020-12-20 RX ORDER — QUETIAPINE FUMARATE 25 MG/1
25 TABLET, FILM COATED ORAL DAILY
Status: DISCONTINUED | OUTPATIENT
Start: 2020-12-20 | End: 2020-12-22

## 2020-12-20 RX ADMIN — SODIUM CHLORIDE, PRESERVATIVE FREE 10 ML: 5 INJECTION INTRAVENOUS at 10:21

## 2020-12-20 RX ADMIN — HEPARIN SODIUM 5000 UNITS: 5000 INJECTION INTRAVENOUS; SUBCUTANEOUS at 05:22

## 2020-12-20 RX ADMIN — TAZOBACTAM SODIUM AND PIPERACILLIN SODIUM 3.38 G: 375; 3 INJECTION, SOLUTION INTRAVENOUS at 02:09

## 2020-12-20 RX ADMIN — HEPARIN SODIUM 5000 UNITS: 5000 INJECTION INTRAVENOUS; SUBCUTANEOUS at 13:46

## 2020-12-20 RX ADMIN — TAZOBACTAM SODIUM AND PIPERACILLIN SODIUM 3.38 G: 375; 3 INJECTION, SOLUTION INTRAVENOUS at 17:39

## 2020-12-20 RX ADMIN — LISINOPRIL 5 MG: 5 TABLET ORAL at 10:20

## 2020-12-20 RX ADMIN — TAMSULOSIN HYDROCHLORIDE 0.4 MG: 0.4 CAPSULE ORAL at 21:10

## 2020-12-20 RX ADMIN — LORAZEPAM 1 MG: 2 INJECTION INTRAMUSCULAR; INTRAVENOUS at 00:17

## 2020-12-20 RX ADMIN — METOPROLOL TARTRATE 12.5 MG: 25 TABLET, FILM COATED ORAL at 21:10

## 2020-12-20 RX ADMIN — ATORVASTATIN CALCIUM 20 MG: 20 TABLET, FILM COATED ORAL at 21:10

## 2020-12-20 RX ADMIN — TAZOBACTAM SODIUM AND PIPERACILLIN SODIUM 3.38 G: 375; 3 INJECTION, SOLUTION INTRAVENOUS at 10:20

## 2020-12-20 RX ADMIN — LORAZEPAM 1 MG: 2 INJECTION INTRAMUSCULAR; INTRAVENOUS at 11:04

## 2020-12-20 RX ADMIN — METOPROLOL TARTRATE 12.5 MG: 25 TABLET, FILM COATED ORAL at 10:19

## 2020-12-20 RX ADMIN — POTASSIUM CHLORIDE 40 MEQ: 10 CAPSULE, COATED, EXTENDED RELEASE ORAL at 17:16

## 2020-12-20 RX ADMIN — POTASSIUM CHLORIDE 40 MEQ: 10 CAPSULE, COATED, EXTENDED RELEASE ORAL at 10:50

## 2020-12-20 RX ADMIN — QUETIAPINE FUMARATE 75 MG: 25 TABLET ORAL at 21:10

## 2020-12-20 RX ADMIN — SODIUM CHLORIDE, PRESERVATIVE FREE 10 ML: 5 INJECTION INTRAVENOUS at 21:11

## 2020-12-20 RX ADMIN — HEPARIN SODIUM 5000 UNITS: 5000 INJECTION INTRAVENOUS; SUBCUTANEOUS at 21:10

## 2020-12-21 ENCOUNTER — APPOINTMENT (OUTPATIENT)
Dept: CT IMAGING | Facility: HOSPITAL | Age: 85
End: 2020-12-21

## 2020-12-21 LAB
GLUCOSE BLDC GLUCOMTR-MCNC: 100 MG/DL (ref 70–130)
GLUCOSE BLDC GLUCOMTR-MCNC: 88 MG/DL (ref 70–130)
GLUCOSE BLDC GLUCOMTR-MCNC: 92 MG/DL (ref 70–130)
GLUCOSE BLDC GLUCOMTR-MCNC: 97 MG/DL (ref 70–130)
MAGNESIUM SERPL-MCNC: 1.8 MG/DL (ref 1.7–2.3)
POTASSIUM SERPL-SCNC: 3.8 MMOL/L (ref 3.5–5.2)

## 2020-12-21 PROCEDURE — 82962 GLUCOSE BLOOD TEST: CPT

## 2020-12-21 PROCEDURE — 25010000002 PIPERACILLIN SOD-TAZOBACTAM PER 1 G: Performed by: FAMILY MEDICINE

## 2020-12-21 PROCEDURE — 25010000002 HEPARIN (PORCINE) PER 1000 UNITS: Performed by: NURSE PRACTITIONER

## 2020-12-21 PROCEDURE — 93010 ELECTROCARDIOGRAM REPORT: CPT | Performed by: INTERNAL MEDICINE

## 2020-12-21 PROCEDURE — 70450 CT HEAD/BRAIN W/O DYE: CPT

## 2020-12-21 PROCEDURE — 99233 SBSQ HOSP IP/OBS HIGH 50: CPT | Performed by: INTERNAL MEDICINE

## 2020-12-21 PROCEDURE — 93005 ELECTROCARDIOGRAM TRACING: CPT | Performed by: NURSE PRACTITIONER

## 2020-12-21 PROCEDURE — 97530 THERAPEUTIC ACTIVITIES: CPT

## 2020-12-21 PROCEDURE — 25010000002 AMPICILLIN PER 500 MG: Performed by: INTERNAL MEDICINE

## 2020-12-21 PROCEDURE — 99223 1ST HOSP IP/OBS HIGH 75: CPT | Performed by: PSYCHIATRY & NEUROLOGY

## 2020-12-21 PROCEDURE — 97535 SELF CARE MNGMENT TRAINING: CPT

## 2020-12-21 RX ORDER — MAGNESIUM SULFATE HEPTAHYDRATE 40 MG/ML
4 INJECTION, SOLUTION INTRAVENOUS AS NEEDED
Status: DISCONTINUED | OUTPATIENT
Start: 2020-12-21 | End: 2020-12-25

## 2020-12-21 RX ORDER — SODIUM CHLORIDE 9 MG/ML
50 INJECTION, SOLUTION INTRAVENOUS CONTINUOUS
Status: DISCONTINUED | OUTPATIENT
Start: 2020-12-21 | End: 2020-12-22

## 2020-12-21 RX ORDER — MAGNESIUM SULFATE HEPTAHYDRATE 40 MG/ML
2 INJECTION, SOLUTION INTRAVENOUS AS NEEDED
Status: DISCONTINUED | OUTPATIENT
Start: 2020-12-21 | End: 2020-12-25

## 2020-12-21 RX ORDER — PANTOPRAZOLE SODIUM 40 MG/1
40 TABLET, DELAYED RELEASE ORAL
Status: DISCONTINUED | OUTPATIENT
Start: 2020-12-21 | End: 2020-12-30 | Stop reason: HOSPADM

## 2020-12-21 RX ORDER — QUETIAPINE FUMARATE 25 MG/1
25 TABLET, FILM COATED ORAL ONCE
Status: COMPLETED | OUTPATIENT
Start: 2020-12-21 | End: 2020-12-21

## 2020-12-21 RX ORDER — ASPIRIN 81 MG/1
81 TABLET, CHEWABLE ORAL DAILY
Status: DISCONTINUED | OUTPATIENT
Start: 2020-12-21 | End: 2020-12-30 | Stop reason: HOSPADM

## 2020-12-21 RX ORDER — MAGNESIUM SULFATE 1 G/100ML
1 INJECTION INTRAVENOUS AS NEEDED
Status: DISCONTINUED | OUTPATIENT
Start: 2020-12-21 | End: 2020-12-25

## 2020-12-21 RX ADMIN — QUETIAPINE FUMARATE 25 MG: 25 TABLET ORAL at 13:53

## 2020-12-21 RX ADMIN — AMPICILLIN SODIUM 500 MG: 500 INJECTION, POWDER, FOR SOLUTION INTRAMUSCULAR; INTRAVENOUS at 13:53

## 2020-12-21 RX ADMIN — ASPIRIN 81 MG CHEWABLE TABLET 81 MG: 81 TABLET CHEWABLE at 13:53

## 2020-12-21 RX ADMIN — AMPICILLIN SODIUM 500 MG: 500 INJECTION, POWDER, FOR SOLUTION INTRAMUSCULAR; INTRAVENOUS at 23:29

## 2020-12-21 RX ADMIN — QUETIAPINE FUMARATE 75 MG: 25 TABLET ORAL at 23:18

## 2020-12-21 RX ADMIN — TAMSULOSIN HYDROCHLORIDE 0.4 MG: 0.4 CAPSULE ORAL at 23:19

## 2020-12-21 RX ADMIN — HEPARIN SODIUM 5000 UNITS: 5000 INJECTION INTRAVENOUS; SUBCUTANEOUS at 05:43

## 2020-12-21 RX ADMIN — SODIUM CHLORIDE 50 ML/HR: 9 INJECTION, SOLUTION INTRAVENOUS at 19:02

## 2020-12-21 RX ADMIN — TAZOBACTAM SODIUM AND PIPERACILLIN SODIUM 3.38 G: 375; 3 INJECTION, SOLUTION INTRAVENOUS at 09:19

## 2020-12-21 RX ADMIN — ATORVASTATIN CALCIUM 20 MG: 20 TABLET, FILM COATED ORAL at 23:19

## 2020-12-21 RX ADMIN — TAZOBACTAM SODIUM AND PIPERACILLIN SODIUM 3.38 G: 375; 3 INJECTION, SOLUTION INTRAVENOUS at 02:11

## 2020-12-21 RX ADMIN — HEPARIN SODIUM 5000 UNITS: 5000 INJECTION INTRAVENOUS; SUBCUTANEOUS at 23:19

## 2020-12-21 RX ADMIN — SODIUM CHLORIDE, PRESERVATIVE FREE 10 ML: 5 INJECTION INTRAVENOUS at 09:19

## 2020-12-21 RX ADMIN — QUETIAPINE FUMARATE 25 MG: 25 TABLET ORAL at 09:19

## 2020-12-21 RX ADMIN — LISINOPRIL 5 MG: 5 TABLET ORAL at 09:19

## 2020-12-21 RX ADMIN — METOPROLOL TARTRATE 12.5 MG: 25 TABLET, FILM COATED ORAL at 23:19

## 2020-12-21 RX ADMIN — HEPARIN SODIUM 5000 UNITS: 5000 INJECTION INTRAVENOUS; SUBCUTANEOUS at 13:53

## 2020-12-21 RX ADMIN — PANTOPRAZOLE SODIUM 40 MG: 40 TABLET, DELAYED RELEASE ORAL at 13:53

## 2020-12-21 RX ADMIN — METOPROLOL TARTRATE 12.5 MG: 25 TABLET, FILM COATED ORAL at 09:18

## 2020-12-22 ENCOUNTER — APPOINTMENT (OUTPATIENT)
Dept: NEUROLOGY | Facility: HOSPITAL | Age: 85
End: 2020-12-22

## 2020-12-22 LAB
ANION GAP SERPL CALCULATED.3IONS-SCNC: 19 MMOL/L (ref 5–15)
BUN SERPL-MCNC: 27 MG/DL (ref 8–23)
BUN/CREAT SERPL: 26 (ref 7–25)
CALCIUM SPEC-SCNC: 9 MG/DL (ref 8.2–9.6)
CHLORIDE SERPL-SCNC: 108 MMOL/L (ref 98–107)
CO2 SERPL-SCNC: 18 MMOL/L (ref 22–29)
CREAT SERPL-MCNC: 1.04 MG/DL (ref 0.76–1.27)
DEPRECATED RDW RBC AUTO: 50.6 FL (ref 37–54)
ERYTHROCYTE [DISTWIDTH] IN BLOOD BY AUTOMATED COUNT: 13.4 % (ref 12.3–15.4)
FOLATE SERPL-MCNC: >20 NG/ML (ref 4.78–24.2)
GFR SERPL CREATININE-BSD FRML MDRD: 66 ML/MIN/1.73
GLUCOSE BLDC GLUCOMTR-MCNC: 111 MG/DL (ref 70–130)
GLUCOSE BLDC GLUCOMTR-MCNC: 115 MG/DL (ref 70–130)
GLUCOSE BLDC GLUCOMTR-MCNC: 132 MG/DL (ref 70–130)
GLUCOSE BLDC GLUCOMTR-MCNC: 97 MG/DL (ref 70–130)
GLUCOSE SERPL-MCNC: 100 MG/DL (ref 65–99)
HCT VFR BLD AUTO: 42.5 % (ref 37.5–51)
HGB BLD-MCNC: 12.6 G/DL (ref 13–17.7)
MAGNESIUM SERPL-MCNC: 2.2 MG/DL (ref 1.7–2.3)
MCH RBC QN AUTO: 30.7 PG (ref 26.6–33)
MCHC RBC AUTO-ENTMCNC: 29.6 G/DL (ref 31.5–35.7)
MCV RBC AUTO: 103.7 FL (ref 79–97)
PLATELET # BLD AUTO: 209 10*3/MM3 (ref 140–450)
PMV BLD AUTO: 9.7 FL (ref 6–12)
POTASSIUM SERPL-SCNC: 3.7 MMOL/L (ref 3.5–5.2)
RBC # BLD AUTO: 4.1 10*6/MM3 (ref 4.14–5.8)
SODIUM SERPL-SCNC: 145 MMOL/L (ref 136–145)
VIT B12 BLD-MCNC: >2000 PG/ML (ref 211–946)
WBC # BLD AUTO: 10.39 10*3/MM3 (ref 3.4–10.8)

## 2020-12-22 PROCEDURE — 83735 ASSAY OF MAGNESIUM: CPT | Performed by: INTERNAL MEDICINE

## 2020-12-22 PROCEDURE — 85027 COMPLETE CBC AUTOMATED: CPT | Performed by: INTERNAL MEDICINE

## 2020-12-22 PROCEDURE — 82962 GLUCOSE BLOOD TEST: CPT

## 2020-12-22 PROCEDURE — 25010000002 HEPARIN (PORCINE) PER 1000 UNITS: Performed by: NURSE PRACTITIONER

## 2020-12-22 PROCEDURE — 99233 SBSQ HOSP IP/OBS HIGH 50: CPT | Performed by: INTERNAL MEDICINE

## 2020-12-22 PROCEDURE — 95816 EEG AWAKE AND DROWSY: CPT

## 2020-12-22 PROCEDURE — 99232 SBSQ HOSP IP/OBS MODERATE 35: CPT | Performed by: PSYCHIATRY & NEUROLOGY

## 2020-12-22 PROCEDURE — 82607 VITAMIN B-12: CPT | Performed by: INTERNAL MEDICINE

## 2020-12-22 PROCEDURE — 80048 BASIC METABOLIC PNL TOTAL CA: CPT | Performed by: INTERNAL MEDICINE

## 2020-12-22 PROCEDURE — 25010000002 AMPICILLIN PER 500 MG: Performed by: INTERNAL MEDICINE

## 2020-12-22 PROCEDURE — 25010000002 HALOPERIDOL LACTATE PER 5 MG: Performed by: INTERNAL MEDICINE

## 2020-12-22 PROCEDURE — 82746 ASSAY OF FOLIC ACID SERUM: CPT | Performed by: INTERNAL MEDICINE

## 2020-12-22 PROCEDURE — 25010000002 MAGNESIUM SULFATE 2 GM/50ML SOLUTION: Performed by: INTERNAL MEDICINE

## 2020-12-22 RX ORDER — HALOPERIDOL 5 MG/ML
0.5 INJECTION INTRAMUSCULAR ONCE
Status: COMPLETED | OUTPATIENT
Start: 2020-12-22 | End: 2020-12-22

## 2020-12-22 RX ORDER — SODIUM CHLORIDE, SODIUM LACTATE, POTASSIUM CHLORIDE, CALCIUM CHLORIDE 600; 310; 30; 20 MG/100ML; MG/100ML; MG/100ML; MG/100ML
75 INJECTION, SOLUTION INTRAVENOUS CONTINUOUS
Status: DISCONTINUED | OUTPATIENT
Start: 2020-12-22 | End: 2020-12-25

## 2020-12-22 RX ORDER — QUETIAPINE FUMARATE 25 MG/1
50 TABLET, FILM COATED ORAL DAILY
Status: DISCONTINUED | OUTPATIENT
Start: 2020-12-23 | End: 2020-12-23

## 2020-12-22 RX ORDER — QUETIAPINE FUMARATE 100 MG/1
100 TABLET, FILM COATED ORAL NIGHTLY
Status: DISCONTINUED | OUTPATIENT
Start: 2020-12-22 | End: 2020-12-24

## 2020-12-22 RX ADMIN — SODIUM CHLORIDE, PRESERVATIVE FREE 10 ML: 5 INJECTION INTRAVENOUS at 10:41

## 2020-12-22 RX ADMIN — ATORVASTATIN CALCIUM 20 MG: 20 TABLET, FILM COATED ORAL at 20:54

## 2020-12-22 RX ADMIN — SODIUM CHLORIDE, PRESERVATIVE FREE 10 ML: 5 INJECTION INTRAVENOUS at 21:02

## 2020-12-22 RX ADMIN — METOPROLOL TARTRATE 12.5 MG: 25 TABLET, FILM COATED ORAL at 10:41

## 2020-12-22 RX ADMIN — HEPARIN SODIUM 5000 UNITS: 5000 INJECTION INTRAVENOUS; SUBCUTANEOUS at 21:01

## 2020-12-22 RX ADMIN — AMPICILLIN SODIUM 500 MG: 500 INJECTION, POWDER, FOR SOLUTION INTRAMUSCULAR; INTRAVENOUS at 10:40

## 2020-12-22 RX ADMIN — HEPARIN SODIUM 5000 UNITS: 5000 INJECTION INTRAVENOUS; SUBCUTANEOUS at 14:35

## 2020-12-22 RX ADMIN — MAGNESIUM SULFATE HEPTAHYDRATE 2 G: 2 INJECTION, SOLUTION INTRAVENOUS at 04:16

## 2020-12-22 RX ADMIN — PANTOPRAZOLE SODIUM 40 MG: 40 TABLET, DELAYED RELEASE ORAL at 04:16

## 2020-12-22 RX ADMIN — QUETIAPINE FUMARATE 25 MG: 25 TABLET ORAL at 10:41

## 2020-12-22 RX ADMIN — LISINOPRIL 5 MG: 5 TABLET ORAL at 10:41

## 2020-12-22 RX ADMIN — TAMSULOSIN HYDROCHLORIDE 0.4 MG: 0.4 CAPSULE ORAL at 20:55

## 2020-12-22 RX ADMIN — QUETIAPINE FUMARATE 100 MG: 100 TABLET ORAL at 20:55

## 2020-12-22 RX ADMIN — SODIUM CHLORIDE, POTASSIUM CHLORIDE, SODIUM LACTATE AND CALCIUM CHLORIDE 75 ML/HR: 600; 310; 30; 20 INJECTION, SOLUTION INTRAVENOUS at 14:35

## 2020-12-22 RX ADMIN — AMPICILLIN SODIUM 500 MG: 500 INJECTION, POWDER, FOR SOLUTION INTRAMUSCULAR; INTRAVENOUS at 21:27

## 2020-12-22 RX ADMIN — METOPROLOL TARTRATE 12.5 MG: 25 TABLET, FILM COATED ORAL at 20:54

## 2020-12-22 RX ADMIN — HALOPERIDOL LACTATE 0.5 MG: 5 INJECTION, SOLUTION INTRAMUSCULAR at 10:51

## 2020-12-22 RX ADMIN — ASPIRIN 81 MG CHEWABLE TABLET 81 MG: 81 TABLET CHEWABLE at 10:41

## 2020-12-22 RX ADMIN — HEPARIN SODIUM 5000 UNITS: 5000 INJECTION INTRAVENOUS; SUBCUTANEOUS at 04:16

## 2020-12-23 LAB
ANION GAP SERPL CALCULATED.3IONS-SCNC: 14 MMOL/L (ref 5–15)
BUN SERPL-MCNC: 28 MG/DL (ref 8–23)
BUN/CREAT SERPL: 30.4 (ref 7–25)
CALCIUM SPEC-SCNC: 8.9 MG/DL (ref 8.2–9.6)
CHLORIDE SERPL-SCNC: 108 MMOL/L (ref 98–107)
CO2 SERPL-SCNC: 23 MMOL/L (ref 22–29)
CREAT SERPL-MCNC: 0.92 MG/DL (ref 0.76–1.27)
GFR SERPL CREATININE-BSD FRML MDRD: 76 ML/MIN/1.73
GLUCOSE BLDC GLUCOMTR-MCNC: 100 MG/DL (ref 70–130)
GLUCOSE BLDC GLUCOMTR-MCNC: 118 MG/DL (ref 70–130)
GLUCOSE BLDC GLUCOMTR-MCNC: 118 MG/DL (ref 70–130)
GLUCOSE BLDC GLUCOMTR-MCNC: 119 MG/DL (ref 70–130)
GLUCOSE SERPL-MCNC: 103 MG/DL (ref 65–99)
MAGNESIUM SERPL-MCNC: 1.8 MG/DL (ref 1.7–2.3)
POTASSIUM SERPL-SCNC: 3.5 MMOL/L (ref 3.5–5.2)
SODIUM SERPL-SCNC: 145 MMOL/L (ref 136–145)

## 2020-12-23 PROCEDURE — 82962 GLUCOSE BLOOD TEST: CPT

## 2020-12-23 PROCEDURE — 25010000002 AMPICILLIN PER 500 MG: Performed by: INTERNAL MEDICINE

## 2020-12-23 PROCEDURE — 99232 SBSQ HOSP IP/OBS MODERATE 35: CPT | Performed by: PSYCHIATRY & NEUROLOGY

## 2020-12-23 PROCEDURE — 83735 ASSAY OF MAGNESIUM: CPT | Performed by: INTERNAL MEDICINE

## 2020-12-23 PROCEDURE — 25010000002 LORAZEPAM PER 2 MG: Performed by: NURSE PRACTITIONER

## 2020-12-23 PROCEDURE — 99232 SBSQ HOSP IP/OBS MODERATE 35: CPT | Performed by: INTERNAL MEDICINE

## 2020-12-23 PROCEDURE — 25010000002 HEPARIN (PORCINE) PER 1000 UNITS: Performed by: NURSE PRACTITIONER

## 2020-12-23 PROCEDURE — 97110 THERAPEUTIC EXERCISES: CPT

## 2020-12-23 PROCEDURE — 97530 THERAPEUTIC ACTIVITIES: CPT

## 2020-12-23 PROCEDURE — 80048 BASIC METABOLIC PNL TOTAL CA: CPT | Performed by: INTERNAL MEDICINE

## 2020-12-23 RX ORDER — QUETIAPINE FUMARATE 25 MG/1
75 TABLET, FILM COATED ORAL DAILY
Status: DISCONTINUED | OUTPATIENT
Start: 2020-12-24 | End: 2020-12-30 | Stop reason: HOSPADM

## 2020-12-23 RX ORDER — LORAZEPAM 2 MG/ML
0.25 INJECTION INTRAMUSCULAR ONCE
Status: COMPLETED | OUTPATIENT
Start: 2020-12-23 | End: 2020-12-23

## 2020-12-23 RX ADMIN — HEPARIN SODIUM 5000 UNITS: 5000 INJECTION INTRAVENOUS; SUBCUTANEOUS at 05:01

## 2020-12-23 RX ADMIN — AMPICILLIN SODIUM 500 MG: 500 INJECTION, POWDER, FOR SOLUTION INTRAMUSCULAR; INTRAVENOUS at 09:01

## 2020-12-23 RX ADMIN — ASPIRIN 81 MG CHEWABLE TABLET 81 MG: 81 TABLET CHEWABLE at 09:01

## 2020-12-23 RX ADMIN — QUETIAPINE FUMARATE 25 MG: 25 TABLET ORAL at 10:43

## 2020-12-23 RX ADMIN — METOPROLOL TARTRATE 12.5 MG: 25 TABLET, FILM COATED ORAL at 09:01

## 2020-12-23 RX ADMIN — Medication: at 20:18

## 2020-12-23 RX ADMIN — SODIUM CHLORIDE, PRESERVATIVE FREE 10 ML: 5 INJECTION INTRAVENOUS at 20:21

## 2020-12-23 RX ADMIN — Medication: at 14:59

## 2020-12-23 RX ADMIN — SODIUM CHLORIDE, PRESERVATIVE FREE 10 ML: 5 INJECTION INTRAVENOUS at 10:36

## 2020-12-23 RX ADMIN — METOPROLOL TARTRATE 12.5 MG: 25 TABLET, FILM COATED ORAL at 20:18

## 2020-12-23 RX ADMIN — Medication: at 08:02

## 2020-12-23 RX ADMIN — LORAZEPAM 0.25 MG: 2 INJECTION INTRAMUSCULAR; INTRAVENOUS at 22:47

## 2020-12-23 RX ADMIN — ATORVASTATIN CALCIUM 20 MG: 20 TABLET, FILM COATED ORAL at 20:17

## 2020-12-23 RX ADMIN — PANTOPRAZOLE SODIUM 40 MG: 40 TABLET, DELAYED RELEASE ORAL at 05:02

## 2020-12-23 RX ADMIN — TAMSULOSIN HYDROCHLORIDE 0.4 MG: 0.4 CAPSULE ORAL at 20:17

## 2020-12-23 RX ADMIN — LISINOPRIL 5 MG: 5 TABLET ORAL at 09:01

## 2020-12-23 RX ADMIN — QUETIAPINE FUMARATE 100 MG: 100 TABLET ORAL at 20:18

## 2020-12-23 RX ADMIN — QUETIAPINE FUMARATE 50 MG: 25 TABLET ORAL at 09:01

## 2020-12-23 RX ADMIN — AMPICILLIN SODIUM 500 MG: 500 INJECTION, POWDER, FOR SOLUTION INTRAMUSCULAR; INTRAVENOUS at 20:37

## 2020-12-23 RX ADMIN — HEPARIN SODIUM 5000 UNITS: 5000 INJECTION INTRAVENOUS; SUBCUTANEOUS at 22:40

## 2020-12-23 RX ADMIN — HEPARIN SODIUM 5000 UNITS: 5000 INJECTION INTRAVENOUS; SUBCUTANEOUS at 14:59

## 2020-12-24 LAB
GLUCOSE BLDC GLUCOMTR-MCNC: 104 MG/DL (ref 70–130)
GLUCOSE BLDC GLUCOMTR-MCNC: 117 MG/DL (ref 70–130)
GLUCOSE BLDC GLUCOMTR-MCNC: 134 MG/DL (ref 70–130)
GLUCOSE BLDC GLUCOMTR-MCNC: 139 MG/DL (ref 70–130)
QT INTERVAL: 456 MS
QTC INTERVAL: 516 MS

## 2020-12-24 PROCEDURE — 25010000002 AMPICILLIN PER 500 MG: Performed by: INTERNAL MEDICINE

## 2020-12-24 PROCEDURE — 82962 GLUCOSE BLOOD TEST: CPT

## 2020-12-24 PROCEDURE — 99232 SBSQ HOSP IP/OBS MODERATE 35: CPT | Performed by: PSYCHIATRY & NEUROLOGY

## 2020-12-24 PROCEDURE — 99232 SBSQ HOSP IP/OBS MODERATE 35: CPT | Performed by: INTERNAL MEDICINE

## 2020-12-24 PROCEDURE — 25010000002 HEPARIN (PORCINE) PER 1000 UNITS: Performed by: NURSE PRACTITIONER

## 2020-12-24 RX ORDER — CHOLECALCIFEROL (VITAMIN D3) 125 MCG
5 CAPSULE ORAL NIGHTLY
Status: DISCONTINUED | OUTPATIENT
Start: 2020-12-24 | End: 2020-12-30 | Stop reason: HOSPADM

## 2020-12-24 RX ADMIN — AMPICILLIN SODIUM 500 MG: 500 INJECTION, POWDER, FOR SOLUTION INTRAMUSCULAR; INTRAVENOUS at 08:16

## 2020-12-24 RX ADMIN — AMPICILLIN SODIUM 500 MG: 500 INJECTION, POWDER, FOR SOLUTION INTRAMUSCULAR; INTRAVENOUS at 21:48

## 2020-12-24 RX ADMIN — Medication: at 21:02

## 2020-12-24 RX ADMIN — SODIUM CHLORIDE, PRESERVATIVE FREE 10 ML: 5 INJECTION INTRAVENOUS at 08:47

## 2020-12-24 RX ADMIN — Medication: at 08:22

## 2020-12-24 RX ADMIN — TAMSULOSIN HYDROCHLORIDE 0.4 MG: 0.4 CAPSULE ORAL at 20:57

## 2020-12-24 RX ADMIN — METOPROLOL TARTRATE 12.5 MG: 25 TABLET, FILM COATED ORAL at 20:57

## 2020-12-24 RX ADMIN — HEPARIN SODIUM 5000 UNITS: 5000 INJECTION INTRAVENOUS; SUBCUTANEOUS at 08:15

## 2020-12-24 RX ADMIN — QUETIAPINE FUMARATE 125 MG: 100 TABLET ORAL at 20:58

## 2020-12-24 RX ADMIN — LISINOPRIL 5 MG: 5 TABLET ORAL at 08:14

## 2020-12-24 RX ADMIN — Medication 5 MG: at 20:57

## 2020-12-24 RX ADMIN — METOPROLOL TARTRATE 12.5 MG: 25 TABLET, FILM COATED ORAL at 08:13

## 2020-12-24 RX ADMIN — ATORVASTATIN CALCIUM 20 MG: 20 TABLET, FILM COATED ORAL at 20:57

## 2020-12-24 RX ADMIN — PANTOPRAZOLE SODIUM 40 MG: 40 TABLET, DELAYED RELEASE ORAL at 08:14

## 2020-12-24 RX ADMIN — Medication: at 14:23

## 2020-12-24 RX ADMIN — ASPIRIN 81 MG CHEWABLE TABLET 81 MG: 81 TABLET CHEWABLE at 08:48

## 2020-12-24 RX ADMIN — HEPARIN SODIUM 5000 UNITS: 5000 INJECTION INTRAVENOUS; SUBCUTANEOUS at 14:23

## 2020-12-24 RX ADMIN — SODIUM CHLORIDE, PRESERVATIVE FREE 10 ML: 5 INJECTION INTRAVENOUS at 20:58

## 2020-12-24 RX ADMIN — ACETAMINOPHEN 650 MG: 325 TABLET, FILM COATED ORAL at 20:57

## 2020-12-24 RX ADMIN — HEPARIN SODIUM 5000 UNITS: 5000 INJECTION INTRAVENOUS; SUBCUTANEOUS at 20:58

## 2020-12-24 RX ADMIN — QUETIAPINE FUMARATE 75 MG: 25 TABLET ORAL at 08:13

## 2020-12-25 LAB
GLUCOSE BLDC GLUCOMTR-MCNC: 105 MG/DL (ref 70–130)
GLUCOSE BLDC GLUCOMTR-MCNC: 112 MG/DL (ref 70–130)
GLUCOSE BLDC GLUCOMTR-MCNC: 116 MG/DL (ref 70–130)

## 2020-12-25 PROCEDURE — 25010000002 AMPICILLIN PER 500 MG: Performed by: INTERNAL MEDICINE

## 2020-12-25 PROCEDURE — 99232 SBSQ HOSP IP/OBS MODERATE 35: CPT | Performed by: INTERNAL MEDICINE

## 2020-12-25 PROCEDURE — 82962 GLUCOSE BLOOD TEST: CPT

## 2020-12-25 PROCEDURE — 25010000002 HEPARIN (PORCINE) PER 1000 UNITS: Performed by: NURSE PRACTITIONER

## 2020-12-25 PROCEDURE — 99231 SBSQ HOSP IP/OBS SF/LOW 25: CPT | Performed by: PSYCHIATRY & NEUROLOGY

## 2020-12-25 RX ORDER — LORAZEPAM 0.5 MG/1
0.5 TABLET ORAL EVERY 6 HOURS PRN
Status: COMPLETED | OUTPATIENT
Start: 2020-12-25 | End: 2020-12-25

## 2020-12-25 RX ORDER — AMPICILLIN 500 MG/1
500 CAPSULE ORAL 2 TIMES DAILY
Status: COMPLETED | OUTPATIENT
Start: 2020-12-25 | End: 2020-12-27

## 2020-12-25 RX ADMIN — QUETIAPINE FUMARATE 25 MG: 25 TABLET ORAL at 13:53

## 2020-12-25 RX ADMIN — LISINOPRIL 5 MG: 5 TABLET ORAL at 08:56

## 2020-12-25 RX ADMIN — METOPROLOL TARTRATE 12.5 MG: 25 TABLET, FILM COATED ORAL at 21:40

## 2020-12-25 RX ADMIN — ATORVASTATIN CALCIUM 20 MG: 20 TABLET, FILM COATED ORAL at 21:40

## 2020-12-25 RX ADMIN — METOPROLOL TARTRATE 12.5 MG: 25 TABLET, FILM COATED ORAL at 08:56

## 2020-12-25 RX ADMIN — Medication: at 02:00

## 2020-12-25 RX ADMIN — LORAZEPAM 0.5 MG: 0.5 TABLET ORAL at 15:24

## 2020-12-25 RX ADMIN — SODIUM CHLORIDE, PRESERVATIVE FREE 10 ML: 5 INJECTION INTRAVENOUS at 08:57

## 2020-12-25 RX ADMIN — PANTOPRAZOLE SODIUM 40 MG: 40 TABLET, DELAYED RELEASE ORAL at 06:57

## 2020-12-25 RX ADMIN — HEPARIN SODIUM 5000 UNITS: 5000 INJECTION INTRAVENOUS; SUBCUTANEOUS at 14:16

## 2020-12-25 RX ADMIN — AMPICILLIN SODIUM 500 MG: 500 INJECTION, POWDER, FOR SOLUTION INTRAMUSCULAR; INTRAVENOUS at 08:56

## 2020-12-25 RX ADMIN — Medication: at 08:57

## 2020-12-25 RX ADMIN — ASPIRIN 81 MG CHEWABLE TABLET 81 MG: 81 TABLET CHEWABLE at 08:56

## 2020-12-25 RX ADMIN — LORAZEPAM 0.5 MG: 0.5 TABLET ORAL at 21:40

## 2020-12-25 RX ADMIN — Medication 5 MG: at 21:40

## 2020-12-25 RX ADMIN — QUETIAPINE FUMARATE 75 MG: 25 TABLET ORAL at 08:56

## 2020-12-25 RX ADMIN — HEPARIN SODIUM 5000 UNITS: 5000 INJECTION INTRAVENOUS; SUBCUTANEOUS at 21:40

## 2020-12-25 RX ADMIN — TAMSULOSIN HYDROCHLORIDE 0.4 MG: 0.4 CAPSULE ORAL at 21:40

## 2020-12-25 RX ADMIN — AMPICILLIN 500 MG: 500 CAPSULE ORAL at 21:46

## 2020-12-25 RX ADMIN — HEPARIN SODIUM 5000 UNITS: 5000 INJECTION INTRAVENOUS; SUBCUTANEOUS at 06:57

## 2020-12-25 RX ADMIN — Medication: at 14:33

## 2020-12-25 RX ADMIN — Medication: at 21:41

## 2020-12-25 RX ADMIN — QUETIAPINE FUMARATE 125 MG: 100 TABLET ORAL at 21:40

## 2020-12-26 LAB
GLUCOSE BLDC GLUCOMTR-MCNC: 105 MG/DL (ref 70–130)
GLUCOSE BLDC GLUCOMTR-MCNC: 111 MG/DL (ref 70–130)
GLUCOSE BLDC GLUCOMTR-MCNC: 144 MG/DL (ref 70–130)

## 2020-12-26 PROCEDURE — 99232 SBSQ HOSP IP/OBS MODERATE 35: CPT | Performed by: PSYCHIATRY & NEUROLOGY

## 2020-12-26 PROCEDURE — 82962 GLUCOSE BLOOD TEST: CPT

## 2020-12-26 PROCEDURE — 25010000002 HEPARIN (PORCINE) PER 1000 UNITS: Performed by: NURSE PRACTITIONER

## 2020-12-26 PROCEDURE — 99231 SBSQ HOSP IP/OBS SF/LOW 25: CPT | Performed by: INTERNAL MEDICINE

## 2020-12-26 RX ORDER — TEMAZEPAM 15 MG/1
15 CAPSULE ORAL NIGHTLY PRN
Status: DISCONTINUED | OUTPATIENT
Start: 2020-12-26 | End: 2020-12-30 | Stop reason: HOSPADM

## 2020-12-26 RX ADMIN — HEPARIN SODIUM 5000 UNITS: 5000 INJECTION INTRAVENOUS; SUBCUTANEOUS at 05:13

## 2020-12-26 RX ADMIN — AMPICILLIN 500 MG: 500 CAPSULE ORAL at 20:17

## 2020-12-26 RX ADMIN — HEPARIN SODIUM 5000 UNITS: 5000 INJECTION INTRAVENOUS; SUBCUTANEOUS at 14:20

## 2020-12-26 RX ADMIN — QUETIAPINE FUMARATE 125 MG: 100 TABLET ORAL at 20:17

## 2020-12-26 RX ADMIN — TAMSULOSIN HYDROCHLORIDE 0.4 MG: 0.4 CAPSULE ORAL at 20:16

## 2020-12-26 RX ADMIN — PANTOPRAZOLE SODIUM 40 MG: 40 TABLET, DELAYED RELEASE ORAL at 05:13

## 2020-12-26 RX ADMIN — HEPARIN SODIUM 5000 UNITS: 5000 INJECTION INTRAVENOUS; SUBCUTANEOUS at 20:17

## 2020-12-26 RX ADMIN — METOPROLOL TARTRATE 12.5 MG: 25 TABLET, FILM COATED ORAL at 20:17

## 2020-12-26 RX ADMIN — Medication: at 20:18

## 2020-12-26 RX ADMIN — QUETIAPINE FUMARATE 75 MG: 25 TABLET ORAL at 10:47

## 2020-12-26 RX ADMIN — Medication 5 MG: at 20:17

## 2020-12-26 RX ADMIN — QUETIAPINE FUMARATE 25 MG: 25 TABLET ORAL at 05:14

## 2020-12-26 RX ADMIN — Medication: at 10:47

## 2020-12-26 RX ADMIN — ATORVASTATIN CALCIUM 20 MG: 20 TABLET, FILM COATED ORAL at 20:17

## 2020-12-26 RX ADMIN — ASPIRIN 81 MG CHEWABLE TABLET 81 MG: 81 TABLET CHEWABLE at 10:47

## 2020-12-26 RX ADMIN — AMPICILLIN 500 MG: 500 CAPSULE ORAL at 11:00

## 2020-12-26 RX ADMIN — LISINOPRIL 5 MG: 5 TABLET ORAL at 10:46

## 2020-12-26 RX ADMIN — METOPROLOL TARTRATE 12.5 MG: 25 TABLET, FILM COATED ORAL at 10:47

## 2020-12-27 LAB
GLUCOSE BLDC GLUCOMTR-MCNC: 129 MG/DL (ref 70–130)
GLUCOSE BLDC GLUCOMTR-MCNC: 132 MG/DL (ref 70–130)
GLUCOSE BLDC GLUCOMTR-MCNC: 159 MG/DL (ref 70–130)
GLUCOSE BLDC GLUCOMTR-MCNC: 167 MG/DL (ref 70–130)

## 2020-12-27 PROCEDURE — 99231 SBSQ HOSP IP/OBS SF/LOW 25: CPT | Performed by: PSYCHIATRY & NEUROLOGY

## 2020-12-27 PROCEDURE — 63710000001 INSULIN LISPRO (HUMAN) PER 5 UNITS: Performed by: NURSE PRACTITIONER

## 2020-12-27 PROCEDURE — 99232 SBSQ HOSP IP/OBS MODERATE 35: CPT | Performed by: INTERNAL MEDICINE

## 2020-12-27 PROCEDURE — 25010000002 HEPARIN (PORCINE) PER 1000 UNITS: Performed by: NURSE PRACTITIONER

## 2020-12-27 PROCEDURE — 97530 THERAPEUTIC ACTIVITIES: CPT

## 2020-12-27 PROCEDURE — 82962 GLUCOSE BLOOD TEST: CPT

## 2020-12-27 RX ORDER — BISACODYL 10 MG
10 SUPPOSITORY, RECTAL RECTAL DAILY PRN
Status: DISCONTINUED | OUTPATIENT
Start: 2020-12-27 | End: 2020-12-30 | Stop reason: HOSPADM

## 2020-12-27 RX ORDER — TAMSULOSIN HYDROCHLORIDE 0.4 MG/1
0.8 CAPSULE ORAL NIGHTLY
Status: DISCONTINUED | OUTPATIENT
Start: 2020-12-27 | End: 2020-12-30 | Stop reason: HOSPADM

## 2020-12-27 RX ADMIN — TAMSULOSIN HYDROCHLORIDE 0.8 MG: 0.4 CAPSULE ORAL at 20:04

## 2020-12-27 RX ADMIN — DOCUSATE SODIUM 100 MG: 100 CAPSULE ORAL at 15:31

## 2020-12-27 RX ADMIN — Medication 5 MG: at 20:04

## 2020-12-27 RX ADMIN — METOPROLOL TARTRATE 12.5 MG: 25 TABLET, FILM COATED ORAL at 08:36

## 2020-12-27 RX ADMIN — Medication: at 02:04

## 2020-12-27 RX ADMIN — METOPROLOL TARTRATE 12.5 MG: 25 TABLET, FILM COATED ORAL at 20:04

## 2020-12-27 RX ADMIN — ASPIRIN 81 MG CHEWABLE TABLET 81 MG: 81 TABLET CHEWABLE at 08:28

## 2020-12-27 RX ADMIN — PANTOPRAZOLE SODIUM 40 MG: 40 TABLET, DELAYED RELEASE ORAL at 06:30

## 2020-12-27 RX ADMIN — LISINOPRIL 5 MG: 5 TABLET ORAL at 08:36

## 2020-12-27 RX ADMIN — ATORVASTATIN CALCIUM 20 MG: 20 TABLET, FILM COATED ORAL at 20:04

## 2020-12-27 RX ADMIN — AMPICILLIN 500 MG: 500 CAPSULE ORAL at 08:28

## 2020-12-27 RX ADMIN — INSULIN LISPRO 2 UNITS: 100 INJECTION, SOLUTION INTRAVENOUS; SUBCUTANEOUS at 09:13

## 2020-12-27 RX ADMIN — HEPARIN SODIUM 5000 UNITS: 5000 INJECTION INTRAVENOUS; SUBCUTANEOUS at 06:29

## 2020-12-27 RX ADMIN — Medication: at 20:04

## 2020-12-27 RX ADMIN — QUETIAPINE FUMARATE 75 MG: 25 TABLET ORAL at 08:28

## 2020-12-27 RX ADMIN — AMPICILLIN 500 MG: 500 CAPSULE ORAL at 20:04

## 2020-12-27 RX ADMIN — BISACODYL 10 MG: 10 SUPPOSITORY RECTAL at 15:31

## 2020-12-27 RX ADMIN — QUETIAPINE FUMARATE 125 MG: 100 TABLET ORAL at 20:04

## 2020-12-28 PROBLEM — D64.9 ANEMIA: Status: ACTIVE | Noted: 2020-12-28

## 2020-12-28 LAB
ANION GAP SERPL CALCULATED.3IONS-SCNC: 12 MMOL/L (ref 5–15)
BUN SERPL-MCNC: 32 MG/DL (ref 8–23)
BUN/CREAT SERPL: 30.5 (ref 7–25)
CALCIUM SPEC-SCNC: 8.9 MG/DL (ref 8.2–9.6)
CHLORIDE SERPL-SCNC: 106 MMOL/L (ref 98–107)
CO2 SERPL-SCNC: 24 MMOL/L (ref 22–29)
CREAT SERPL-MCNC: 1.05 MG/DL (ref 0.76–1.27)
DEPRECATED RDW RBC AUTO: 47.5 FL (ref 37–54)
ERYTHROCYTE [DISTWIDTH] IN BLOOD BY AUTOMATED COUNT: 13.4 % (ref 12.3–15.4)
FLUAV RNA RESP QL NAA+PROBE: NOT DETECTED
FLUBV RNA RESP QL NAA+PROBE: NOT DETECTED
GFR SERPL CREATININE-BSD FRML MDRD: 65 ML/MIN/1.73
GLUCOSE BLDC GLUCOMTR-MCNC: 105 MG/DL (ref 70–130)
GLUCOSE BLDC GLUCOMTR-MCNC: 131 MG/DL (ref 70–130)
GLUCOSE BLDC GLUCOMTR-MCNC: 155 MG/DL (ref 70–130)
GLUCOSE BLDC GLUCOMTR-MCNC: 98 MG/DL (ref 70–130)
GLUCOSE SERPL-MCNC: 152 MG/DL (ref 65–99)
HCT VFR BLD AUTO: 33.3 % (ref 37.5–51)
HCT VFR BLD AUTO: 34.1 % (ref 37.5–51)
HGB BLD-MCNC: 10.7 G/DL (ref 13–17.7)
HGB BLD-MCNC: 10.9 G/DL (ref 13–17.7)
MCH RBC QN AUTO: 30.9 PG (ref 26.6–33)
MCHC RBC AUTO-ENTMCNC: 32 G/DL (ref 31.5–35.7)
MCV RBC AUTO: 96.6 FL (ref 79–97)
PLATELET # BLD AUTO: 259 10*3/MM3 (ref 140–450)
PMV BLD AUTO: 10.5 FL (ref 6–12)
POTASSIUM SERPL-SCNC: 3.6 MMOL/L (ref 3.5–5.2)
RBC # BLD AUTO: 3.53 10*6/MM3 (ref 4.14–5.8)
SARS-COV-2 RNA RESP QL NAA+PROBE: NOT DETECTED
SODIUM SERPL-SCNC: 142 MMOL/L (ref 136–145)
WBC # BLD AUTO: 8.33 10*3/MM3 (ref 3.4–10.8)

## 2020-12-28 PROCEDURE — 85027 COMPLETE CBC AUTOMATED: CPT | Performed by: INTERNAL MEDICINE

## 2020-12-28 PROCEDURE — 82962 GLUCOSE BLOOD TEST: CPT

## 2020-12-28 PROCEDURE — 97110 THERAPEUTIC EXERCISES: CPT

## 2020-12-28 PROCEDURE — 85018 HEMOGLOBIN: CPT | Performed by: INTERNAL MEDICINE

## 2020-12-28 PROCEDURE — 63710000001 INSULIN LISPRO (HUMAN) PER 5 UNITS: Performed by: NURSE PRACTITIONER

## 2020-12-28 PROCEDURE — 99233 SBSQ HOSP IP/OBS HIGH 50: CPT | Performed by: INTERNAL MEDICINE

## 2020-12-28 PROCEDURE — 87636 SARSCOV2 & INF A&B AMP PRB: CPT | Performed by: INTERNAL MEDICINE

## 2020-12-28 PROCEDURE — 85014 HEMATOCRIT: CPT | Performed by: INTERNAL MEDICINE

## 2020-12-28 PROCEDURE — 80048 BASIC METABOLIC PNL TOTAL CA: CPT | Performed by: INTERNAL MEDICINE

## 2020-12-28 RX ORDER — QUETIAPINE FUMARATE 25 MG/1
12.5 TABLET, FILM COATED ORAL ONCE
Status: COMPLETED | OUTPATIENT
Start: 2020-12-28 | End: 2020-12-28

## 2020-12-28 RX ADMIN — QUETIAPINE FUMARATE 25 MG: 25 TABLET ORAL at 15:23

## 2020-12-28 RX ADMIN — Medication: at 20:54

## 2020-12-28 RX ADMIN — TEMAZEPAM 15 MG: 15 CAPSULE ORAL at 20:53

## 2020-12-28 RX ADMIN — METOPROLOL TARTRATE 12.5 MG: 25 TABLET, FILM COATED ORAL at 20:53

## 2020-12-28 RX ADMIN — ASPIRIN 81 MG CHEWABLE TABLET 81 MG: 81 TABLET CHEWABLE at 10:17

## 2020-12-28 RX ADMIN — QUETIAPINE FUMARATE 125 MG: 100 TABLET ORAL at 20:53

## 2020-12-28 RX ADMIN — ATORVASTATIN CALCIUM 20 MG: 20 TABLET, FILM COATED ORAL at 20:53

## 2020-12-28 RX ADMIN — SODIUM CHLORIDE, PRESERVATIVE FREE 10 ML: 5 INJECTION INTRAVENOUS at 20:55

## 2020-12-28 RX ADMIN — INSULIN LISPRO 2 UNITS: 100 INJECTION, SOLUTION INTRAVENOUS; SUBCUTANEOUS at 07:58

## 2020-12-28 RX ADMIN — Medication 5 MG: at 20:54

## 2020-12-28 RX ADMIN — QUETIAPINE FUMARATE 75 MG: 25 TABLET ORAL at 10:16

## 2020-12-28 RX ADMIN — ACETAMINOPHEN 650 MG: 325 TABLET, FILM COATED ORAL at 15:23

## 2020-12-28 RX ADMIN — SODIUM CHLORIDE 500 ML: 9 INJECTION, SOLUTION INTRAVENOUS at 13:34

## 2020-12-28 RX ADMIN — Medication: at 07:58

## 2020-12-28 RX ADMIN — Medication: at 13:46

## 2020-12-28 RX ADMIN — TAMSULOSIN HYDROCHLORIDE 0.8 MG: 0.4 CAPSULE ORAL at 20:54

## 2020-12-28 RX ADMIN — QUETIAPINE FUMARATE 25 MG: 25 TABLET ORAL at 20:54

## 2020-12-28 RX ADMIN — Medication: at 02:05

## 2020-12-28 RX ADMIN — PANTOPRAZOLE SODIUM 40 MG: 40 TABLET, DELAYED RELEASE ORAL at 06:35

## 2020-12-28 RX ADMIN — METOPROLOL TARTRATE 12.5 MG: 25 TABLET, FILM COATED ORAL at 10:17

## 2020-12-28 RX ADMIN — QUETIAPINE FUMARATE 12.5 MG: 25 TABLET ORAL at 17:42

## 2020-12-29 LAB
ANION GAP SERPL CALCULATED.3IONS-SCNC: 10 MMOL/L (ref 5–15)
BASOPHILS # BLD AUTO: 0.03 10*3/MM3 (ref 0–0.2)
BASOPHILS NFR BLD AUTO: 0.3 % (ref 0–1.5)
BUN SERPL-MCNC: 29 MG/DL (ref 8–23)
BUN/CREAT SERPL: 31.9 (ref 7–25)
CA-I SERPL ISE-MCNC: 1.3 MMOL/L (ref 1.12–1.32)
CALCIUM SPEC-SCNC: 8.8 MG/DL (ref 8.2–9.6)
CHLORIDE SERPL-SCNC: 109 MMOL/L (ref 98–107)
CO2 SERPL-SCNC: 25 MMOL/L (ref 22–29)
CREAT SERPL-MCNC: 0.91 MG/DL (ref 0.76–1.27)
DEPRECATED RDW RBC AUTO: 46.9 FL (ref 37–54)
EOSINOPHIL # BLD AUTO: 0.16 10*3/MM3 (ref 0–0.4)
EOSINOPHIL NFR BLD AUTO: 1.8 % (ref 0.3–6.2)
ERYTHROCYTE [DISTWIDTH] IN BLOOD BY AUTOMATED COUNT: 13.4 % (ref 12.3–15.4)
FERRITIN SERPL-MCNC: 177 NG/ML (ref 30–400)
FOLATE SERPL-MCNC: 17.2 NG/ML (ref 4.78–24.2)
GFR SERPL CREATININE-BSD FRML MDRD: 77 ML/MIN/1.73
GLUCOSE BLDC GLUCOMTR-MCNC: 112 MG/DL (ref 70–130)
GLUCOSE BLDC GLUCOMTR-MCNC: 115 MG/DL (ref 70–130)
GLUCOSE BLDC GLUCOMTR-MCNC: 122 MG/DL (ref 70–130)
GLUCOSE BLDC GLUCOMTR-MCNC: 163 MG/DL (ref 70–130)
GLUCOSE BLDC GLUCOMTR-MCNC: 168 MG/DL (ref 70–130)
GLUCOSE SERPL-MCNC: 115 MG/DL (ref 65–99)
HCT VFR BLD AUTO: 34 % (ref 37.5–51)
HGB BLD-MCNC: 10.7 G/DL (ref 13–17.7)
IMM GRANULOCYTES # BLD AUTO: 0.04 10*3/MM3 (ref 0–0.05)
IMM GRANULOCYTES NFR BLD AUTO: 0.4 % (ref 0–0.5)
IRON 24H UR-MRATE: 57 MCG/DL (ref 59–158)
IRON SATN MFR SERPL: 20 % (ref 20–50)
LYMPHOCYTES # BLD AUTO: 1.31 10*3/MM3 (ref 0.7–3.1)
LYMPHOCYTES NFR BLD AUTO: 14.5 % (ref 19.6–45.3)
MAGNESIUM SERPL-MCNC: 1.6 MG/DL (ref 1.7–2.3)
MCH RBC QN AUTO: 30 PG (ref 26.6–33)
MCHC RBC AUTO-ENTMCNC: 31.5 G/DL (ref 31.5–35.7)
MCV RBC AUTO: 95.2 FL (ref 79–97)
MONOCYTES # BLD AUTO: 0.83 10*3/MM3 (ref 0.1–0.9)
MONOCYTES NFR BLD AUTO: 9.2 % (ref 5–12)
NEUTROPHILS NFR BLD AUTO: 6.67 10*3/MM3 (ref 1.7–7)
NEUTROPHILS NFR BLD AUTO: 73.8 % (ref 42.7–76)
NRBC BLD AUTO-RTO: 0 /100 WBC (ref 0–0.2)
PHOSPHATE SERPL-MCNC: 2.6 MG/DL (ref 2.5–4.5)
PLATELET # BLD AUTO: 282 10*3/MM3 (ref 140–450)
PMV BLD AUTO: 10.3 FL (ref 6–12)
POTASSIUM SERPL-SCNC: 3.4 MMOL/L (ref 3.5–5.2)
RBC # BLD AUTO: 3.57 10*6/MM3 (ref 4.14–5.8)
SODIUM SERPL-SCNC: 144 MMOL/L (ref 136–145)
TIBC SERPL-MCNC: 279 MCG/DL (ref 298–536)
TRANSFERRIN SERPL-MCNC: 187 MG/DL (ref 200–360)
VIT B12 BLD-MCNC: 1074 PG/ML (ref 211–946)
WBC # BLD AUTO: 9.04 10*3/MM3 (ref 3.4–10.8)

## 2020-12-29 PROCEDURE — 82607 VITAMIN B-12: CPT | Performed by: INTERNAL MEDICINE

## 2020-12-29 PROCEDURE — 82330 ASSAY OF CALCIUM: CPT | Performed by: INTERNAL MEDICINE

## 2020-12-29 PROCEDURE — 99232 SBSQ HOSP IP/OBS MODERATE 35: CPT | Performed by: INTERNAL MEDICINE

## 2020-12-29 PROCEDURE — 82728 ASSAY OF FERRITIN: CPT | Performed by: INTERNAL MEDICINE

## 2020-12-29 PROCEDURE — 82746 ASSAY OF FOLIC ACID SERUM: CPT | Performed by: INTERNAL MEDICINE

## 2020-12-29 PROCEDURE — 85025 COMPLETE CBC W/AUTO DIFF WBC: CPT | Performed by: INTERNAL MEDICINE

## 2020-12-29 PROCEDURE — 83735 ASSAY OF MAGNESIUM: CPT | Performed by: INTERNAL MEDICINE

## 2020-12-29 PROCEDURE — 84466 ASSAY OF TRANSFERRIN: CPT | Performed by: INTERNAL MEDICINE

## 2020-12-29 PROCEDURE — 80048 BASIC METABOLIC PNL TOTAL CA: CPT | Performed by: INTERNAL MEDICINE

## 2020-12-29 PROCEDURE — 83540 ASSAY OF IRON: CPT | Performed by: INTERNAL MEDICINE

## 2020-12-29 PROCEDURE — 84100 ASSAY OF PHOSPHORUS: CPT | Performed by: INTERNAL MEDICINE

## 2020-12-29 PROCEDURE — 82962 GLUCOSE BLOOD TEST: CPT

## 2020-12-29 RX ADMIN — ACETAMINOPHEN ORAL SOLUTION 650 MG: 650 SOLUTION ORAL at 11:43

## 2020-12-29 RX ADMIN — ASPIRIN 81 MG CHEWABLE TABLET 81 MG: 81 TABLET CHEWABLE at 08:39

## 2020-12-29 RX ADMIN — Medication: at 14:58

## 2020-12-29 RX ADMIN — TAMSULOSIN HYDROCHLORIDE 0.8 MG: 0.4 CAPSULE ORAL at 20:37

## 2020-12-29 RX ADMIN — ATORVASTATIN CALCIUM 20 MG: 20 TABLET, FILM COATED ORAL at 20:37

## 2020-12-29 RX ADMIN — PANTOPRAZOLE SODIUM 40 MG: 40 TABLET, DELAYED RELEASE ORAL at 05:20

## 2020-12-29 RX ADMIN — DOCUSATE SODIUM 100 MG: 100 CAPSULE ORAL at 11:43

## 2020-12-29 RX ADMIN — Medication 5 MG: at 20:37

## 2020-12-29 RX ADMIN — QUETIAPINE FUMARATE 125 MG: 100 TABLET ORAL at 20:36

## 2020-12-29 RX ADMIN — LISINOPRIL 5 MG: 5 TABLET ORAL at 08:40

## 2020-12-29 RX ADMIN — Medication: at 20:37

## 2020-12-29 RX ADMIN — QUETIAPINE FUMARATE 75 MG: 25 TABLET ORAL at 08:40

## 2020-12-29 RX ADMIN — Medication: at 08:40

## 2020-12-29 RX ADMIN — METOPROLOL TARTRATE 12.5 MG: 25 TABLET, FILM COATED ORAL at 20:36

## 2020-12-29 RX ADMIN — POTASSIUM CHLORIDE 40 MEQ: 1.5 POWDER, FOR SOLUTION ORAL at 20:37

## 2020-12-29 RX ADMIN — QUETIAPINE FUMARATE 25 MG: 25 TABLET ORAL at 11:43

## 2020-12-29 RX ADMIN — POTASSIUM CHLORIDE 40 MEQ: 1.5 POWDER, FOR SOLUTION ORAL at 11:43

## 2020-12-29 RX ADMIN — METOPROLOL TARTRATE 12.5 MG: 25 TABLET, FILM COATED ORAL at 08:40

## 2020-12-30 VITALS
SYSTOLIC BLOOD PRESSURE: 126 MMHG | TEMPERATURE: 98.1 F | HEIGHT: 68 IN | OXYGEN SATURATION: 95 % | BODY MASS INDEX: 21.67 KG/M2 | DIASTOLIC BLOOD PRESSURE: 70 MMHG | WEIGHT: 143 LBS | RESPIRATION RATE: 18 BRPM | HEART RATE: 75 BPM

## 2020-12-30 PROBLEM — R07.9 CHEST PAIN: Status: RESOLVED | Noted: 2020-12-16 | Resolved: 2020-12-30

## 2020-12-30 PROBLEM — I21.4 NSTEMI (NON-ST ELEVATED MYOCARDIAL INFARCTION) (HCC): Status: RESOLVED | Noted: 2020-12-17 | Resolved: 2020-12-30

## 2020-12-30 PROBLEM — I50.23 ACUTE ON CHRONIC SYSTOLIC CHF (CONGESTIVE HEART FAILURE) (HCC): Status: RESOLVED | Noted: 2020-12-18 | Resolved: 2020-12-30

## 2020-12-30 LAB
GLUCOSE BLDC GLUCOMTR-MCNC: 129 MG/DL (ref 70–130)
POTASSIUM SERPL-SCNC: 4.6 MMOL/L (ref 3.5–5.2)

## 2020-12-30 PROCEDURE — 99239 HOSP IP/OBS DSCHRG MGMT >30: CPT | Performed by: NURSE PRACTITIONER

## 2020-12-30 PROCEDURE — 84132 ASSAY OF SERUM POTASSIUM: CPT | Performed by: INTERNAL MEDICINE

## 2020-12-30 PROCEDURE — 82962 GLUCOSE BLOOD TEST: CPT

## 2020-12-30 RX ORDER — NITROGLYCERIN 0.4 MG/1
0.4 TABLET SUBLINGUAL
Refills: 12
Start: 2020-12-30 | End: 2021-09-21 | Stop reason: HOSPADM

## 2020-12-30 RX ORDER — CHOLECALCIFEROL (VITAMIN D3) 125 MCG
5 CAPSULE ORAL NIGHTLY
Start: 2020-12-30

## 2020-12-30 RX ORDER — TEMAZEPAM 15 MG/1
15 CAPSULE ORAL NIGHTLY PRN
Qty: 3 CAPSULE | Refills: 0 | Status: SHIPPED | OUTPATIENT
Start: 2020-12-30 | End: 2021-01-02

## 2020-12-30 RX ORDER — ACETAMINOPHEN 325 MG/1
650 TABLET ORAL EVERY 4 HOURS PRN
Start: 2020-12-30

## 2020-12-30 RX ORDER — QUETIAPINE FUMARATE 25 MG/1
75 TABLET, FILM COATED ORAL EVERY MORNING
Start: 2020-12-30

## 2020-12-30 RX ORDER — PSEUDOEPHEDRINE HCL 30 MG
100 TABLET ORAL 2 TIMES DAILY PRN
Start: 2020-12-30

## 2020-12-30 RX ORDER — LISINOPRIL 5 MG/1
5 TABLET ORAL DAILY
Start: 2020-12-31 | End: 2021-09-21 | Stop reason: HOSPADM

## 2020-12-30 RX ORDER — ASPIRIN 81 MG/1
81 TABLET, CHEWABLE ORAL DAILY
Start: 2020-12-31

## 2020-12-30 RX ORDER — PANTOPRAZOLE SODIUM 40 MG/1
40 TABLET, DELAYED RELEASE ORAL DAILY
Start: 2020-12-30

## 2020-12-30 RX ORDER — ATORVASTATIN CALCIUM 20 MG/1
20 TABLET, FILM COATED ORAL NIGHTLY
Start: 2020-12-30

## 2020-12-30 RX ORDER — QUETIAPINE FUMARATE 25 MG/1
125 TABLET, FILM COATED ORAL
Start: 2020-12-30 | End: 2021-09-21 | Stop reason: HOSPADM

## 2020-12-30 RX ORDER — QUETIAPINE FUMARATE 25 MG/1
25 TABLET, FILM COATED ORAL 2 TIMES DAILY PRN
Start: 2020-12-30 | End: 2021-09-21 | Stop reason: HOSPADM

## 2020-12-30 RX ADMIN — METOPROLOL TARTRATE 12.5 MG: 25 TABLET, FILM COATED ORAL at 08:32

## 2020-12-30 RX ADMIN — QUETIAPINE FUMARATE 75 MG: 25 TABLET ORAL at 08:32

## 2020-12-30 RX ADMIN — Medication 5 ML: at 08:33

## 2020-12-30 RX ADMIN — LISINOPRIL 5 MG: 5 TABLET ORAL at 08:32

## 2020-12-30 RX ADMIN — PANTOPRAZOLE SODIUM 40 MG: 40 TABLET, DELAYED RELEASE ORAL at 05:19

## 2020-12-30 RX ADMIN — ASPIRIN 81 MG CHEWABLE TABLET 81 MG: 81 TABLET CHEWABLE at 08:32

## 2021-09-18 ENCOUNTER — APPOINTMENT (OUTPATIENT)
Dept: CT IMAGING | Facility: HOSPITAL | Age: 86
DRG: 481 | End: 2021-09-18
Payer: MEDICARE

## 2021-09-18 ENCOUNTER — APPOINTMENT (OUTPATIENT)
Dept: GENERAL RADIOLOGY | Facility: HOSPITAL | Age: 86
DRG: 481 | End: 2021-09-18
Payer: MEDICARE

## 2021-09-18 ENCOUNTER — ANESTHESIA EVENT (OUTPATIENT)
Dept: PERIOP | Facility: HOSPITAL | Age: 86
End: 2021-09-18

## 2021-09-18 ENCOUNTER — ANESTHESIA (OUTPATIENT)
Dept: PERIOP | Facility: HOSPITAL | Age: 86
End: 2021-09-18

## 2021-09-18 ENCOUNTER — HOSPITAL ENCOUNTER (INPATIENT)
Facility: HOSPITAL | Age: 86
LOS: 3 days | Discharge: NURSING, REHAB OR LONG-TERM CARE | DRG: 481 | End: 2021-09-21
Attending: EMERGENCY MEDICINE | Admitting: INTERNAL MEDICINE
Payer: MEDICARE

## 2021-09-18 DIAGNOSIS — Z86.59 HISTORY OF DEMENTIA: ICD-10-CM

## 2021-09-18 DIAGNOSIS — S72.141A CLOSED DISPLACED INTERTROCHANTERIC FRACTURE OF RIGHT FEMUR, INITIAL ENCOUNTER (HCC): Primary | ICD-10-CM

## 2021-09-18 PROBLEM — I50.32 DIASTOLIC CHF, CHRONIC (HCC): Status: ACTIVE | Noted: 2021-09-18

## 2021-09-18 PROBLEM — I25.2 HISTORY OF MYOCARDIAL INFARCTION: Status: ACTIVE | Noted: 2021-09-18

## 2021-09-18 PROBLEM — F03.90 DEMENTIA WITHOUT BEHAVIORAL DISTURBANCE (HCC): Status: ACTIVE | Noted: 2021-09-18

## 2021-09-18 PROBLEM — S72.001A CLOSED RIGHT HIP FRACTURE (HCC): Status: ACTIVE | Noted: 2021-09-18

## 2021-09-18 PROBLEM — N40.0 BPH WITHOUT OBSTRUCTION/LOWER URINARY TRACT SYMPTOMS: Status: ACTIVE | Noted: 2021-09-18

## 2021-09-18 PROBLEM — D72.829 LEUKOCYTOSIS: Status: ACTIVE | Noted: 2021-09-18

## 2021-09-18 PROBLEM — F41.9 ANXIETY DISORDER: Status: ACTIVE | Noted: 2021-09-18

## 2021-09-18 LAB
ABO GROUP BLD: NORMAL
ABO GROUP BLD: NORMAL
ALBUMIN SERPL-MCNC: 3.6 G/DL (ref 3.5–5.2)
ALBUMIN/GLOB SERPL: 1.1 G/DL
ALP SERPL-CCNC: 113 U/L (ref 39–117)
ALT SERPL W P-5'-P-CCNC: 15 U/L (ref 1–41)
ANION GAP SERPL CALCULATED.3IONS-SCNC: 13 MMOL/L (ref 5–15)
APTT PPP: 28.8 SECONDS (ref 22–39)
AST SERPL-CCNC: 20 U/L (ref 1–40)
BACTERIA UR QL AUTO: ABNORMAL /HPF
BASOPHILS # BLD AUTO: 0.05 10*3/MM3 (ref 0–0.2)
BASOPHILS NFR BLD AUTO: 0.4 % (ref 0–1.5)
BILIRUB SERPL-MCNC: 0.2 MG/DL (ref 0–1.2)
BILIRUB UR QL STRIP: NEGATIVE
BLD GP AB SCN SERPL QL: NEGATIVE
BUN SERPL-MCNC: 36 MG/DL (ref 8–23)
BUN/CREAT SERPL: 32.1 (ref 7–25)
CALCIUM SPEC-SCNC: 9 MG/DL (ref 8.2–9.6)
CHLORIDE SERPL-SCNC: 104 MMOL/L (ref 98–107)
CLARITY UR: CLEAR
CO2 SERPL-SCNC: 22 MMOL/L (ref 22–29)
COLOR UR: YELLOW
CREAT SERPL-MCNC: 1.12 MG/DL (ref 0.76–1.27)
DEPRECATED RDW RBC AUTO: 63.5 FL (ref 37–54)
EOSINOPHIL # BLD AUTO: 0.65 10*3/MM3 (ref 0–0.4)
EOSINOPHIL NFR BLD AUTO: 5.3 % (ref 0.3–6.2)
ERYTHROCYTE [DISTWIDTH] IN BLOOD BY AUTOMATED COUNT: 18.9 % (ref 12.3–15.4)
GFR SERPL CREATININE-BSD FRML MDRD: 61 ML/MIN/1.73
GLOBULIN UR ELPH-MCNC: 3.3 GM/DL
GLUCOSE BLDC GLUCOMTR-MCNC: 131 MG/DL (ref 70–130)
GLUCOSE BLDC GLUCOMTR-MCNC: 146 MG/DL (ref 70–130)
GLUCOSE BLDC GLUCOMTR-MCNC: 164 MG/DL (ref 70–130)
GLUCOSE SERPL-MCNC: 129 MG/DL (ref 65–99)
GLUCOSE UR STRIP-MCNC: NEGATIVE MG/DL
HCT VFR BLD AUTO: 28 % (ref 37.5–51)
HGB BLD-MCNC: 8.8 G/DL (ref 13–17.7)
HGB UR QL STRIP.AUTO: ABNORMAL
HYALINE CASTS UR QL AUTO: ABNORMAL /LPF
IMM GRANULOCYTES # BLD AUTO: 0.05 10*3/MM3 (ref 0–0.05)
IMM GRANULOCYTES NFR BLD AUTO: 0.4 % (ref 0–0.5)
INR PPP: 1.26 (ref 0.85–1.16)
KETONES UR QL STRIP: NEGATIVE
LEUKOCYTE ESTERASE UR QL STRIP.AUTO: ABNORMAL
LYMPHOCYTES # BLD AUTO: 2.46 10*3/MM3 (ref 0.7–3.1)
LYMPHOCYTES NFR BLD AUTO: 20.2 % (ref 19.6–45.3)
MCH RBC QN AUTO: 28.9 PG (ref 26.6–33)
MCHC RBC AUTO-ENTMCNC: 31.4 G/DL (ref 31.5–35.7)
MCV RBC AUTO: 91.8 FL (ref 79–97)
MONOCYTES # BLD AUTO: 0.78 10*3/MM3 (ref 0.1–0.9)
MONOCYTES NFR BLD AUTO: 6.4 % (ref 5–12)
NEUTROPHILS NFR BLD AUTO: 67.3 % (ref 42.7–76)
NEUTROPHILS NFR BLD AUTO: 8.21 10*3/MM3 (ref 1.7–7)
NITRITE UR QL STRIP: NEGATIVE
NRBC BLD AUTO-RTO: 0 /100 WBC (ref 0–0.2)
PH UR STRIP.AUTO: 6.5 [PH] (ref 5–8)
PLATELET # BLD AUTO: 349 10*3/MM3 (ref 140–450)
PMV BLD AUTO: 10 FL (ref 6–12)
POTASSIUM SERPL-SCNC: 4 MMOL/L (ref 3.5–5.2)
POTASSIUM SERPL-SCNC: 4.5 MMOL/L (ref 3.5–5.2)
PROCALCITONIN SERPL-MCNC: 0.07 NG/ML (ref 0–0.25)
PROT SERPL-MCNC: 6.9 G/DL (ref 6–8.5)
PROT UR QL STRIP: ABNORMAL
PROTHROMBIN TIME: 15.4 SECONDS (ref 11.4–14.4)
QT INTERVAL: 458 MS
QTC INTERVAL: 441 MS
RBC # BLD AUTO: 3.05 10*6/MM3 (ref 4.14–5.8)
RBC # UR: ABNORMAL /HPF
REF LAB TEST METHOD: ABNORMAL
RH BLD: POSITIVE
RH BLD: POSITIVE
SARS-COV-2 RDRP RESP QL NAA+PROBE: NORMAL
SODIUM SERPL-SCNC: 139 MMOL/L (ref 136–145)
SP GR UR STRIP: 1.02 (ref 1–1.03)
SQUAMOUS #/AREA URNS HPF: ABNORMAL /HPF
T&S EXPIRATION DATE: NORMAL
UROBILINOGEN UR QL STRIP: ABNORMAL
WBC # BLD AUTO: 12.2 10*3/MM3 (ref 3.4–10.8)
WBC UR QL AUTO: ABNORMAL /HPF

## 2021-09-18 PROCEDURE — 99223 1ST HOSP IP/OBS HIGH 75: CPT | Performed by: INTERNAL MEDICINE

## 2021-09-18 PROCEDURE — 70450 CT HEAD/BRAIN W/O DYE: CPT

## 2021-09-18 PROCEDURE — 25010000002 ONDANSETRON PER 1 MG: Performed by: NURSE ANESTHETIST, CERTIFIED REGISTERED

## 2021-09-18 PROCEDURE — 84132 ASSAY OF SERUM POTASSIUM: CPT | Performed by: ANESTHESIOLOGY

## 2021-09-18 PROCEDURE — C1713 ANCHOR/SCREW BN/BN,TIS/BN: HCPCS | Performed by: ORTHOPAEDIC SURGERY

## 2021-09-18 PROCEDURE — 86900 BLOOD TYPING SEROLOGIC ABO: CPT

## 2021-09-18 PROCEDURE — C1769 GUIDE WIRE: HCPCS | Performed by: ORTHOPAEDIC SURGERY

## 2021-09-18 PROCEDURE — 87077 CULTURE AEROBIC IDENTIFY: CPT | Performed by: INTERNAL MEDICINE

## 2021-09-18 PROCEDURE — 25010000002 FENTANYL CITRATE (PF) 50 MCG/ML SOLUTION: Performed by: NURSE ANESTHETIST, CERTIFIED REGISTERED

## 2021-09-18 PROCEDURE — 0QS636Z REPOSITION RIGHT UPPER FEMUR WITH INTRAMEDULLARY INTERNAL FIXATION DEVICE, PERCUTANEOUS APPROACH: ICD-10-PCS | Performed by: ORTHOPAEDIC SURGERY

## 2021-09-18 PROCEDURE — 86901 BLOOD TYPING SEROLOGIC RH(D): CPT

## 2021-09-18 PROCEDURE — 81001 URINALYSIS AUTO W/SCOPE: CPT | Performed by: INTERNAL MEDICINE

## 2021-09-18 PROCEDURE — 86900 BLOOD TYPING SEROLOGIC ABO: CPT | Performed by: INTERNAL MEDICINE

## 2021-09-18 PROCEDURE — 25010000003 CEFAZOLIN IN DEXTROSE 2-4 GM/100ML-% SOLUTION: Performed by: ORTHOPAEDIC SURGERY

## 2021-09-18 PROCEDURE — 97530 THERAPEUTIC ACTIVITIES: CPT

## 2021-09-18 PROCEDURE — 71045 X-RAY EXAM CHEST 1 VIEW: CPT

## 2021-09-18 PROCEDURE — 73502 X-RAY EXAM HIP UNI 2-3 VIEWS: CPT

## 2021-09-18 PROCEDURE — 86901 BLOOD TYPING SEROLOGIC RH(D): CPT | Performed by: INTERNAL MEDICINE

## 2021-09-18 PROCEDURE — 93005 ELECTROCARDIOGRAM TRACING: CPT | Performed by: EMERGENCY MEDICINE

## 2021-09-18 PROCEDURE — 87635 SARS-COV-2 COVID-19 AMP PRB: CPT | Performed by: INTERNAL MEDICINE

## 2021-09-18 PROCEDURE — 99284 EMERGENCY DEPT VISIT MOD MDM: CPT

## 2021-09-18 PROCEDURE — 87186 SC STD MICRODIL/AGAR DIL: CPT | Performed by: INTERNAL MEDICINE

## 2021-09-18 PROCEDURE — 85610 PROTHROMBIN TIME: CPT | Performed by: INTERNAL MEDICINE

## 2021-09-18 PROCEDURE — 84145 PROCALCITONIN (PCT): CPT | Performed by: INTERNAL MEDICINE

## 2021-09-18 PROCEDURE — 25010000002 CLONIDINE PER 1 MG: Performed by: ORTHOPAEDIC SURGERY

## 2021-09-18 PROCEDURE — 99221 1ST HOSP IP/OBS SF/LOW 40: CPT | Performed by: ORTHOPAEDIC SURGERY

## 2021-09-18 PROCEDURE — 97161 PT EVAL LOW COMPLEX 20 MIN: CPT

## 2021-09-18 PROCEDURE — 25010000002 KETOROLAC TROMETHAMINE PER 15 MG: Performed by: ORTHOPAEDIC SURGERY

## 2021-09-18 PROCEDURE — 85730 THROMBOPLASTIN TIME PARTIAL: CPT | Performed by: INTERNAL MEDICINE

## 2021-09-18 PROCEDURE — 85025 COMPLETE CBC W/AUTO DIFF WBC: CPT | Performed by: EMERGENCY MEDICINE

## 2021-09-18 PROCEDURE — 25010000002 PROPOFOL 10 MG/ML EMULSION: Performed by: NURSE ANESTHETIST, CERTIFIED REGISTERED

## 2021-09-18 PROCEDURE — 25010000002 NEOSTIGMINE 10 MG/10ML SOLUTION: Performed by: NURSE ANESTHETIST, CERTIFIED REGISTERED

## 2021-09-18 PROCEDURE — 86850 RBC ANTIBODY SCREEN: CPT | Performed by: INTERNAL MEDICINE

## 2021-09-18 PROCEDURE — 86923 COMPATIBILITY TEST ELECTRIC: CPT

## 2021-09-18 PROCEDURE — 76000 FLUOROSCOPY <1 HR PHYS/QHP: CPT

## 2021-09-18 PROCEDURE — 80053 COMPREHEN METABOLIC PANEL: CPT | Performed by: EMERGENCY MEDICINE

## 2021-09-18 PROCEDURE — 82962 GLUCOSE BLOOD TEST: CPT

## 2021-09-18 PROCEDURE — 87086 URINE CULTURE/COLONY COUNT: CPT | Performed by: INTERNAL MEDICINE

## 2021-09-18 DEVICE — TRIGEN LOW PROFILE SCREW 5.0MM X 32.5MM
Type: IMPLANTABLE DEVICE | Site: HIP | Status: FUNCTIONAL
Brand: TRIGEN

## 2021-09-18 DEVICE — INTERTAN LAG/COMPRESSION SCREW KIT                                    100MM / 95MM
Type: IMPLANTABLE DEVICE | Site: HIP | Status: FUNCTIONAL
Brand: TRIGEN

## 2021-09-18 DEVICE — TRIGEN INTERTAN 10S 10MM X 18CM 125DEGREE
Type: IMPLANTABLE DEVICE | Site: HIP | Status: FUNCTIONAL
Brand: TRIGEN

## 2021-09-18 RX ORDER — LISINOPRIL 5 MG/1
5 TABLET ORAL DAILY
Status: DISCONTINUED | OUTPATIENT
Start: 2021-09-18 | End: 2021-09-19

## 2021-09-18 RX ORDER — FENTANYL CITRATE 50 UG/ML
INJECTION, SOLUTION INTRAMUSCULAR; INTRAVENOUS AS NEEDED
Status: DISCONTINUED | OUTPATIENT
Start: 2021-09-18 | End: 2021-09-18 | Stop reason: SURG

## 2021-09-18 RX ORDER — OXYCODONE HYDROCHLORIDE 5 MG/1
5 TABLET ORAL EVERY 4 HOURS PRN
Status: DISCONTINUED | OUTPATIENT
Start: 2021-09-18 | End: 2021-09-21 | Stop reason: HOSPADM

## 2021-09-18 RX ORDER — FAMOTIDINE 10 MG/ML
20 INJECTION, SOLUTION INTRAVENOUS ONCE
Status: COMPLETED | OUTPATIENT
Start: 2021-09-18 | End: 2021-09-18

## 2021-09-18 RX ORDER — TRANEXAMIC ACID 10 MG/ML
1000 INJECTION, SOLUTION INTRAVENOUS ONCE
Status: DISCONTINUED | OUTPATIENT
Start: 2021-09-18 | End: 2021-09-18

## 2021-09-18 RX ORDER — ATORVASTATIN CALCIUM 20 MG/1
20 TABLET, FILM COATED ORAL NIGHTLY
Status: DISCONTINUED | OUTPATIENT
Start: 2021-09-18 | End: 2021-09-21 | Stop reason: HOSPADM

## 2021-09-18 RX ORDER — SODIUM CHLORIDE 0.9 % (FLUSH) 0.9 %
10 SYRINGE (ML) INJECTION EVERY 12 HOURS SCHEDULED
Status: DISCONTINUED | OUTPATIENT
Start: 2021-09-18 | End: 2021-09-21 | Stop reason: HOSPADM

## 2021-09-18 RX ORDER — LIDOCAINE HYDROCHLORIDE 10 MG/ML
INJECTION, SOLUTION EPIDURAL; INFILTRATION; INTRACAUDAL; PERINEURAL AS NEEDED
Status: DISCONTINUED | OUTPATIENT
Start: 2021-09-18 | End: 2021-09-18 | Stop reason: SURG

## 2021-09-18 RX ORDER — NICOTINE POLACRILEX 4 MG
15 LOZENGE BUCCAL
Status: DISCONTINUED | OUTPATIENT
Start: 2021-09-18 | End: 2021-09-21 | Stop reason: HOSPADM

## 2021-09-18 RX ORDER — HYDROMORPHONE HYDROCHLORIDE 1 MG/ML
0.5 INJECTION, SOLUTION INTRAMUSCULAR; INTRAVENOUS; SUBCUTANEOUS
Status: DISCONTINUED | OUTPATIENT
Start: 2021-09-18 | End: 2021-09-21 | Stop reason: HOSPADM

## 2021-09-18 RX ORDER — LIDOCAINE HYDROCHLORIDE 10 MG/ML
0.5 INJECTION, SOLUTION EPIDURAL; INFILTRATION; INTRACAUDAL; PERINEURAL ONCE AS NEEDED
Status: DISCONTINUED | OUTPATIENT
Start: 2021-09-18 | End: 2021-09-18 | Stop reason: HOSPADM

## 2021-09-18 RX ORDER — ACETAMINOPHEN 500 MG
1000 TABLET ORAL EVERY 8 HOURS SCHEDULED
Status: DISCONTINUED | OUTPATIENT
Start: 2021-09-18 | End: 2021-09-21 | Stop reason: HOSPADM

## 2021-09-18 RX ORDER — HYDROMORPHONE HYDROCHLORIDE 1 MG/ML
0.5 INJECTION, SOLUTION INTRAMUSCULAR; INTRAVENOUS; SUBCUTANEOUS
Status: DISCONTINUED | OUTPATIENT
Start: 2021-09-18 | End: 2021-09-18 | Stop reason: HOSPADM

## 2021-09-18 RX ORDER — TRANEXAMIC ACID 10 MG/ML
1000 INJECTION, SOLUTION INTRAVENOUS 2 TIMES DAILY
Status: DISPENSED | OUTPATIENT
Start: 2021-09-18 | End: 2021-09-19

## 2021-09-18 RX ORDER — SODIUM CHLORIDE 0.9 % (FLUSH) 0.9 %
10 SYRINGE (ML) INJECTION AS NEEDED
Status: DISCONTINUED | OUTPATIENT
Start: 2021-09-18 | End: 2021-09-18 | Stop reason: HOSPADM

## 2021-09-18 RX ORDER — CEFAZOLIN SODIUM 2 G/100ML
2 INJECTION, SOLUTION INTRAVENOUS ONCE
Status: COMPLETED | OUTPATIENT
Start: 2021-09-18 | End: 2021-09-18

## 2021-09-18 RX ORDER — HYDROCODONE BITARTRATE AND ACETAMINOPHEN 5; 325 MG/1; MG/1
1 TABLET ORAL EVERY 6 HOURS PRN
Status: DISCONTINUED | OUTPATIENT
Start: 2021-09-18 | End: 2021-09-21 | Stop reason: HOSPADM

## 2021-09-18 RX ORDER — TRANEXAMIC ACID 100 MG/ML
INJECTION, SOLUTION INTRAVENOUS AS NEEDED
Status: DISCONTINUED | OUTPATIENT
Start: 2021-09-18 | End: 2021-09-18 | Stop reason: SURG

## 2021-09-18 RX ORDER — FAMOTIDINE 20 MG/1
20 TABLET, FILM COATED ORAL ONCE
Status: DISCONTINUED | OUTPATIENT
Start: 2021-09-18 | End: 2021-09-18 | Stop reason: HOSPADM

## 2021-09-18 RX ORDER — NEOSTIGMINE METHYLSULFATE 1 MG/ML
INJECTION, SOLUTION INTRAVENOUS AS NEEDED
Status: DISCONTINUED | OUTPATIENT
Start: 2021-09-18 | End: 2021-09-18 | Stop reason: SURG

## 2021-09-18 RX ORDER — CHOLECALCIFEROL (VITAMIN D3) 125 MCG
5 CAPSULE ORAL NIGHTLY
Status: DISCONTINUED | OUTPATIENT
Start: 2021-09-18 | End: 2021-09-21 | Stop reason: HOSPADM

## 2021-09-18 RX ORDER — SODIUM CHLORIDE 0.9 % (FLUSH) 0.9 %
10 SYRINGE (ML) INJECTION EVERY 12 HOURS SCHEDULED
Status: DISCONTINUED | OUTPATIENT
Start: 2021-09-18 | End: 2021-09-18 | Stop reason: HOSPADM

## 2021-09-18 RX ORDER — PAROXETINE HYDROCHLORIDE 20 MG/1
20 TABLET, FILM COATED ORAL EVERY MORNING
Status: DISCONTINUED | OUTPATIENT
Start: 2021-09-18 | End: 2021-09-21 | Stop reason: HOSPADM

## 2021-09-18 RX ORDER — CEFAZOLIN SODIUM 2 G/100ML
2 INJECTION, SOLUTION INTRAVENOUS EVERY 8 HOURS
Status: COMPLETED | OUTPATIENT
Start: 2021-09-18 | End: 2021-09-19

## 2021-09-18 RX ORDER — OXYCODONE HYDROCHLORIDE 5 MG/1
10 TABLET ORAL EVERY 4 HOURS PRN
Status: DISCONTINUED | OUTPATIENT
Start: 2021-09-18 | End: 2021-09-21 | Stop reason: HOSPADM

## 2021-09-18 RX ORDER — ACETAMINOPHEN 325 MG/1
650 TABLET ORAL EVERY 4 HOURS PRN
Status: DISCONTINUED | OUTPATIENT
Start: 2021-09-18 | End: 2021-09-21 | Stop reason: HOSPADM

## 2021-09-18 RX ORDER — MORPHINE SULFATE 2 MG/ML
2 INJECTION, SOLUTION INTRAMUSCULAR; INTRAVENOUS
Status: DISCONTINUED | OUTPATIENT
Start: 2021-09-18 | End: 2021-09-21 | Stop reason: HOSPADM

## 2021-09-18 RX ORDER — MIDAZOLAM HYDROCHLORIDE 1 MG/ML
0.5 INJECTION INTRAMUSCULAR; INTRAVENOUS
Status: DISCONTINUED | OUTPATIENT
Start: 2021-09-18 | End: 2021-09-18 | Stop reason: HOSPADM

## 2021-09-18 RX ORDER — ATRACURIUM BESYLATE 10 MG/ML
INJECTION, SOLUTION INTRAVENOUS AS NEEDED
Status: DISCONTINUED | OUTPATIENT
Start: 2021-09-18 | End: 2021-09-18 | Stop reason: SURG

## 2021-09-18 RX ORDER — MAGNESIUM HYDROXIDE 1200 MG/15ML
LIQUID ORAL AS NEEDED
Status: DISCONTINUED | OUTPATIENT
Start: 2021-09-18 | End: 2021-09-18 | Stop reason: HOSPADM

## 2021-09-18 RX ORDER — ASPIRIN 81 MG/1
81 TABLET ORAL EVERY 12 HOURS SCHEDULED
Status: DISCONTINUED | OUTPATIENT
Start: 2021-09-19 | End: 2021-09-21 | Stop reason: HOSPADM

## 2021-09-18 RX ORDER — CEFAZOLIN SODIUM 2 G/100ML
2 INJECTION, SOLUTION INTRAVENOUS ONCE
Status: DISCONTINUED | OUTPATIENT
Start: 2021-09-18 | End: 2021-09-18 | Stop reason: SDUPTHER

## 2021-09-18 RX ORDER — ONDANSETRON 2 MG/ML
4 INJECTION INTRAMUSCULAR; INTRAVENOUS ONCE AS NEEDED
Status: DISCONTINUED | OUTPATIENT
Start: 2021-09-18 | End: 2021-09-18 | Stop reason: HOSPADM

## 2021-09-18 RX ORDER — ONDANSETRON 2 MG/ML
4 INJECTION INTRAMUSCULAR; INTRAVENOUS EVERY 6 HOURS PRN
Status: DISCONTINUED | OUTPATIENT
Start: 2021-09-18 | End: 2021-09-21 | Stop reason: HOSPADM

## 2021-09-18 RX ORDER — SODIUM CHLORIDE, SODIUM LACTATE, POTASSIUM CHLORIDE, CALCIUM CHLORIDE 600; 310; 30; 20 MG/100ML; MG/100ML; MG/100ML; MG/100ML
100 INJECTION, SOLUTION INTRAVENOUS CONTINUOUS
Status: DISCONTINUED | OUTPATIENT
Start: 2021-09-18 | End: 2021-09-21 | Stop reason: HOSPADM

## 2021-09-18 RX ORDER — TRAMADOL HYDROCHLORIDE 50 MG/1
50 TABLET ORAL EVERY 8 HOURS PRN
Status: DISCONTINUED | OUTPATIENT
Start: 2021-09-18 | End: 2021-09-21 | Stop reason: HOSPADM

## 2021-09-18 RX ORDER — PROPOFOL 10 MG/ML
VIAL (ML) INTRAVENOUS AS NEEDED
Status: DISCONTINUED | OUTPATIENT
Start: 2021-09-18 | End: 2021-09-18 | Stop reason: SURG

## 2021-09-18 RX ORDER — SODIUM CHLORIDE 0.9 % (FLUSH) 0.9 %
10 SYRINGE (ML) INJECTION AS NEEDED
Status: DISCONTINUED | OUTPATIENT
Start: 2021-09-18 | End: 2021-09-21 | Stop reason: HOSPADM

## 2021-09-18 RX ORDER — SODIUM CHLORIDE, SODIUM LACTATE, POTASSIUM CHLORIDE, CALCIUM CHLORIDE 600; 310; 30; 20 MG/100ML; MG/100ML; MG/100ML; MG/100ML
9 INJECTION, SOLUTION INTRAVENOUS CONTINUOUS
Status: DISCONTINUED | OUTPATIENT
Start: 2021-09-18 | End: 2021-09-21 | Stop reason: HOSPADM

## 2021-09-18 RX ORDER — NALOXONE HCL 0.4 MG/ML
0.1 VIAL (ML) INJECTION
Status: DISCONTINUED | OUTPATIENT
Start: 2021-09-18 | End: 2021-09-21 | Stop reason: HOSPADM

## 2021-09-18 RX ORDER — ASPIRIN 81 MG/1
81 TABLET, CHEWABLE ORAL DAILY
Status: DISCONTINUED | OUTPATIENT
Start: 2021-09-18 | End: 2021-09-18 | Stop reason: SDUPTHER

## 2021-09-18 RX ORDER — DOCUSATE SODIUM 100 MG/1
100 CAPSULE, LIQUID FILLED ORAL 2 TIMES DAILY PRN
Status: DISCONTINUED | OUTPATIENT
Start: 2021-09-18 | End: 2021-09-21 | Stop reason: HOSPADM

## 2021-09-18 RX ORDER — TAMSULOSIN HYDROCHLORIDE 0.4 MG/1
0.4 CAPSULE ORAL NIGHTLY
Status: DISCONTINUED | OUTPATIENT
Start: 2021-09-19 | End: 2021-09-21 | Stop reason: HOSPADM

## 2021-09-18 RX ORDER — QUETIAPINE FUMARATE 25 MG/1
75 TABLET, FILM COATED ORAL EVERY MORNING
Status: DISCONTINUED | OUTPATIENT
Start: 2021-09-18 | End: 2021-09-21 | Stop reason: HOSPADM

## 2021-09-18 RX ORDER — PANTOPRAZOLE SODIUM 40 MG/1
40 TABLET, DELAYED RELEASE ORAL DAILY
Status: DISCONTINUED | OUTPATIENT
Start: 2021-09-18 | End: 2021-09-21 | Stop reason: HOSPADM

## 2021-09-18 RX ORDER — HYDROCODONE BITARTRATE AND ACETAMINOPHEN 5; 325 MG/1; MG/1
1 TABLET ORAL EVERY 6 HOURS PRN
COMMUNITY

## 2021-09-18 RX ORDER — FENTANYL CITRATE 50 UG/ML
50 INJECTION, SOLUTION INTRAMUSCULAR; INTRAVENOUS
Status: DISCONTINUED | OUTPATIENT
Start: 2021-09-18 | End: 2021-09-18 | Stop reason: HOSPADM

## 2021-09-18 RX ORDER — GLYCOPYRROLATE 0.2 MG/ML
INJECTION INTRAMUSCULAR; INTRAVENOUS AS NEEDED
Status: DISCONTINUED | OUTPATIENT
Start: 2021-09-18 | End: 2021-09-18 | Stop reason: SURG

## 2021-09-18 RX ORDER — DEXTROSE MONOHYDRATE 25 G/50ML
25 INJECTION, SOLUTION INTRAVENOUS
Status: DISCONTINUED | OUTPATIENT
Start: 2021-09-18 | End: 2021-09-21 | Stop reason: HOSPADM

## 2021-09-18 RX ORDER — SODIUM CHLORIDE, SODIUM LACTATE, POTASSIUM CHLORIDE, CALCIUM CHLORIDE 600; 310; 30; 20 MG/100ML; MG/100ML; MG/100ML; MG/100ML
75 INJECTION, SOLUTION INTRAVENOUS CONTINUOUS
Status: ACTIVE | OUTPATIENT
Start: 2021-09-18 | End: 2021-09-18

## 2021-09-18 RX ORDER — PAROXETINE HYDROCHLORIDE 20 MG/1
20 TABLET, FILM COATED ORAL EVERY MORNING
COMMUNITY

## 2021-09-18 RX ORDER — ONDANSETRON 2 MG/ML
INJECTION INTRAMUSCULAR; INTRAVENOUS AS NEEDED
Status: DISCONTINUED | OUTPATIENT
Start: 2021-09-18 | End: 2021-09-18 | Stop reason: SURG

## 2021-09-18 RX ADMIN — FENTANYL CITRATE 100 MCG: 50 INJECTION, SOLUTION INTRAMUSCULAR; INTRAVENOUS at 10:28

## 2021-09-18 RX ADMIN — LIDOCAINE HYDROCHLORIDE 50 MG: 10 INJECTION, SOLUTION EPIDURAL; INFILTRATION; INTRACAUDAL; PERINEURAL at 10:28

## 2021-09-18 RX ADMIN — MINERAL OIL 5 ML: 1000 LIQUID ORAL at 19:31

## 2021-09-18 RX ADMIN — ACETAMINOPHEN 1000 MG: 500 TABLET, FILM COATED ORAL at 19:31

## 2021-09-18 RX ADMIN — ATRACURIUM BESYLATE 20 MG: 10 INJECTION, SOLUTION INTRAVENOUS at 10:28

## 2021-09-18 RX ADMIN — CEFAZOLIN SODIUM 2 G: 2 INJECTION, SOLUTION INTRAVENOUS at 17:58

## 2021-09-18 RX ADMIN — TRANEXAMIC ACID 1000 MG: 100 INJECTION, SOLUTION INTRAVENOUS at 11:07

## 2021-09-18 RX ADMIN — SODIUM CHLORIDE, POTASSIUM CHLORIDE, SODIUM LACTATE AND CALCIUM CHLORIDE 75 ML/HR: 600; 310; 30; 20 INJECTION, SOLUTION INTRAVENOUS at 04:02

## 2021-09-18 RX ADMIN — MINERAL OIL 5 ML: 1000 LIQUID ORAL at 06:25

## 2021-09-18 RX ADMIN — HYDROCODONE BITARTRATE AND ACETAMINOPHEN 1 TABLET: 5; 325 TABLET ORAL at 02:28

## 2021-09-18 RX ADMIN — MINERAL OIL 5 ML: 1000 LIQUID ORAL at 16:55

## 2021-09-18 RX ADMIN — QUETIAPINE FUMARATE 125 MG: 100 TABLET ORAL at 19:00

## 2021-09-18 RX ADMIN — GLYCOPYRROLATE 0.1 MG: 0.2 INJECTION INTRAMUSCULAR; INTRAVENOUS at 11:14

## 2021-09-18 RX ADMIN — PROPOFOL 100 MG: 10 INJECTION, EMULSION INTRAVENOUS at 10:28

## 2021-09-18 RX ADMIN — SODIUM CHLORIDE, POTASSIUM CHLORIDE, SODIUM LACTATE AND CALCIUM CHLORIDE 100 ML/HR: 600; 310; 30; 20 INJECTION, SOLUTION INTRAVENOUS at 14:06

## 2021-09-18 RX ADMIN — ACETAMINOPHEN 1000 MG: 500 TABLET, FILM COATED ORAL at 14:05

## 2021-09-18 RX ADMIN — SODIUM CHLORIDE, POTASSIUM CHLORIDE, SODIUM LACTATE AND CALCIUM CHLORIDE: 600; 310; 30; 20 INJECTION, SOLUTION INTRAVENOUS at 10:15

## 2021-09-18 RX ADMIN — NEOSTIGMINE 1 MG: 1 INJECTION INTRAVENOUS at 11:14

## 2021-09-18 RX ADMIN — FAMOTIDINE 20 MG: 10 INJECTION, SOLUTION INTRAVENOUS at 09:22

## 2021-09-18 RX ADMIN — TRANEXAMIC ACID 1000 MG: 100 INJECTION, SOLUTION INTRAVENOUS at 10:40

## 2021-09-18 RX ADMIN — CEFAZOLIN SODIUM 2 G: 2 INJECTION, SOLUTION INTRAVENOUS at 10:32

## 2021-09-18 RX ADMIN — Medication 5 MG: at 19:00

## 2021-09-18 RX ADMIN — ONDANSETRON 4 MG: 2 INJECTION INTRAMUSCULAR; INTRAVENOUS at 10:54

## 2021-09-18 RX ADMIN — ATORVASTATIN CALCIUM 20 MG: 20 TABLET, FILM COATED ORAL at 19:31

## 2021-09-18 NOTE — ANESTHESIA PREPROCEDURE EVALUATION
Anesthesia Evaluation     Patient summary reviewed and Nursing notes reviewed   no history of anesthetic complications:  NPO Solid Status: > 8 hours  NPO Liquid Status: > 8 hours           Airway   Mallampati: II  TM distance: >3 FB  Neck ROM: full  No difficulty expected  Dental      Pulmonary - negative pulmonary ROS and normal exam   Cardiovascular - normal exam    (+) hypertension, past MI , dysrhythmias, CHF , hyperlipidemia,     ROS comment: · Left ventricular ejection fraction appears to be 51 - 55%. Left ventricular systolic function is low normal.  · Left ventricular diastolic function is consistent with (grade Ia w/high LAP) impaired relaxation.  · Estimated right ventricular systolic pressure from tricuspid regurgitation is normal (<35 mmHg). Calculated right ventricular systolic pressure from tricuspid regurgitation is 33 mmHg.       Neuro/Psych  (+) psychiatric history, dementia,     GI/Hepatic/Renal/Endo    (+)   diabetes mellitus,     Musculoskeletal     Abdominal    Substance History      OB/GYN          Other                      Anesthesia Plan    ASA 3     general     intravenous induction     Anesthetic plan, all risks, benefits, and alternatives have been provided, discussed and informed consent has been obtained with: patient.    Plan discussed with CRNA.

## 2021-09-18 NOTE — PLAN OF CARE
Problem: Fall Injury Risk  Goal: Absence of Fall and Fall-Related Injury  Intervention: Promote Injury-Free Environment  Recent Flowsheet Documentation  Taken 9/18/2021 1800 by Sukhjinder Sandhu, RN  Safety Promotion/Fall Prevention:   activity supervised   assistive device/personal items within reach   clutter free environment maintained   elopement precautions   fall prevention program maintained   gait belt   lighting adjusted   mobility aid in reach   muscle strengthening facilitated   nonskid shoes/slippers when out of bed   room organization consistent   safety round/check completed  Taken 9/18/2021 1600 by Sukhjinder Sandhu, RN  Safety Promotion/Fall Prevention:   activity supervised   assistive device/personal items within reach   clutter free environment maintained   gait belt   lighting adjusted   muscle strengthening facilitated   elopement precautions   fall prevention program maintained   mobility aid in reach   nonskid shoes/slippers when out of bed   room organization consistent   safety round/check completed  Taken 9/18/2021 1312 by Sukhjinder Sandhu, RN  Safety Promotion/Fall Prevention:   activity supervised   assistive device/personal items within reach   clutter free environment maintained   elopement precautions   fall prevention program maintained   gait belt   lighting adjusted   nonskid shoes/slippers when out of bed   muscle strengthening facilitated   mobility aid in reach   room organization consistent   safety round/check completed  Taken 9/18/2021 1000 by Sukhjinder Sandhu, RN  Safety Promotion/Fall Prevention: patient off unit  Taken 9/18/2021 0800 by Sukhjinder Sandhu, RN  Safety Promotion/Fall Prevention:   activity supervised   assistive device/personal items within reach   clutter free environment maintained   elopement precautions   gait belt   lighting adjusted   muscle strengthening facilitated   nonskid shoes/slippers when out of bed   fall prevention program maintained    mobility aid in reach   room organization consistent   safety round/check completed   Goal Outcome Evaluation:         VSS. Pt went for  hip nailing today with Dr. Falcon. PT worked with patient

## 2021-09-18 NOTE — Clinical Note
Level of Care: Telemetry [5]   Diagnosis: Closed right hip fracture (CMS/[facility]) [[id number]]   Admitting Physician: MARCO TURNER [[id number]]   Attending Physician: MARCO TURNER [[id number]]   Isolate for COVID?: No [0]   Bed Request Comments: reg   Certification: I Certify That Inpatient Hospital Services Are Medically Necessary For Greater Than 2 Midnights

## 2021-09-18 NOTE — ED PROVIDER NOTES
[place]    EMERGENCY DEPARTMENT ENCOUNTER      Pt Name: Alexys Freeman  MRN: [id number]  YOB: 1924  Date of evaluation: 9/18/2021  Provider: Edgardo Jones MD    CHIEF COMPLAINT       Chief Complaint   Patient presents with   • Fall         HISTORY OF PRESENT ILLNESS  (Location/Symptom, Timing/Onset, Context/Setting, Quality, Duration, Modifying Factors, Severity.)   Alexys Freeman is a 89 y.o. male who presents to the emergency department 30 minutes after mechanical fall onto his right side.  Patient states that he lost his balance and fell, falling onto his right hip.  He denies any other injuries associated with the fall.  He describes moderate aching pain at the right hip is worse with movement but without any distal paresthesias, weakness, or numbness.  Denies use of any anticoagulant medications or any antiplatelet medications apart from aspirin.      Nursing notes were reviewed.    REVIEW OF SYSTEMS    (2-9 systems for level 4, 10 or more for level 5)   ROS:  General:  No fevers, no chills, no weakness  Cardiovascular:  No chest pain, no palpitations  Respiratory:  No shortness of breath, no cough, no wheezing  Gastrointestinal:  No pain, no nausea, no vomiting, no diarrhea  Musculoskeletal: Hip pain  Skin:  No rash  Neurologic:  No speech problems, no headache, no extremity numbness, no extremity tingling, no extremity weakness  Psychiatric:  No anxiety  Genitourinary:  No dysuria, no hematuria    Except as noted above the remainder of the review of systems was reviewed and negative.       PAST MEDICAL HISTORY     Past Medical History:   Diagnosis Date   • Anxiety    • BPH (benign prostatic hyperplasia)    • CHF (congestive heart failure) (CMS/[facility])    • Dementia (CMS/[facility])    • Depression    • Diabetes mellitus (CMS/[facility])    • Hyperlipidemia    • Hypertension    • Myocardial infarction (CMS/[facility])    • UTI (urinary tract infection)          SURGICAL HISTORY       Past Surgical History:   Procedure  Laterality Date   • KIDNEY STONE SURGERY     • REPLACEMENT TOTAL KNEE BILATERAL           CURRENT MEDICATIONS       Current Facility-Administered Medications:   •  acetaminophen (TYLENOL) tablet 650 mg, 650 mg, Oral, Q4H PRN, Micheal Jean, DO  •  aspirin chewable tablet 81 mg, 81 mg, Oral, Daily, Micheal Jean, DO  •  atorvastatin (LIPITOR) tablet 20 mg, 20 mg, Oral, Nightly, Micheal Jean, DO  •  dextrose (D50W) 25 g/ 50mL Intravenous Solution 25 g, 25 g, Intravenous, Q15 Min PRN, Micheal Jean, DO  •  dextrose (GLUTOSE) oral gel 15 g, 15 g, Oral, Q15 Min PRN, Micheal Jean, DO  •  docusate sodium (COLACE) capsule 100 mg, 100 mg, Oral, BID PRN, Micheal Jean, DO  •  glucagon (human recombinant) (GLUCAGEN DIAGNOSTIC) injection 1 mg, 1 mg, Subcutaneous, Q15 Min PRN, Micheal Jean, DO  •  HYDROcodone-acetaminophen (NORCO) 5-325 MG per tablet 1 tablet, 1 tablet, Oral, Q6H PRN, Micheal Jean, DO, 1 tablet at 09/18/21 0228  •  lactated ringers infusion, 75 mL/hr, Intravenous, Continuous, Micheal Jean, , Last Rate: 75 mL/hr at 09/18/21 0402, 75 mL/hr at 09/18/21 0402  •  lisinopril (PRINIVIL,ZESTRIL) tablet 5 mg, 5 mg, Oral, Daily, Micheal Jean, DO  •  melatonin tablet 5 mg, 5 mg, Oral, Nightly, Micheal Jean, DO  •  [START ON 9/19/2021] metoprolol tartrate (LOPRESSOR) half tablet 12.5 mg, 12.5 mg, Oral, Q12H, Micheal Jean, DO  •  morphine injection 2 mg, 2 mg, Intravenous, Q3H PRN, Micheal Jean, DO  •  ondansetron (ZOFRAN) injection 4 mg, 4 mg, Intravenous, Q6H PRN, Micheal Jean, DO  •  palliative care oral rinse, 5 mL, Mouth/Throat, Q6H, Micheal Jean, DO  •  pantoprazole (PROTONIX) EC tablet 40 mg, 40 mg, Oral, Daily, Ted, Micheal, DO  •  PARoxetine (PAXIL) tablet 20 mg, 20 mg, Oral, QARAMANA, Ted, Micheal, DO  •  QUEtiapine (SEROquel) tablet 125 mg, 125 mg, Oral, Nightly, Ted, Micheal, DO  •  QUEtiapine (SEROquel) tablet 75 mg, 75 mg, Oral, JOVANY, Ted, Micheal, DO  •  sodium chloride 0.9 %  flush 10 mL, 10 mL, Intravenous, Q12H, Micheal Jean, DO  •  sodium chloride 0.9 % flush 10 mL, 10 mL, Intravenous, PRN, Micheal Jean, DO  •  [START ON 2021] tamsulosin (FLOMAX) 24 hr capsule 0.4 mg, 0.4 mg, Oral, Nightly, Micheal Jean, DO    ALLERGIES     Patient has no known allergies.    FAMILY HISTORY       Family History   Problem Relation Age of Onset   • Hypertension Mother    • Hypertension Father           SOCIAL HISTORY       Social History     Socioeconomic History   • Marital status:      Spouse name: Not on file   • Number of children: Not on file   • Years of education: Not on file   • Highest education level: Not on file   Tobacco Use   • Smoking status: Former Smoker     Quit date: 10/31/1979     Years since quittin.9   Substance and Sexual Activity   • Alcohol use: Not Currently     Comment: ocassionally has a cocktail   • Drug use: Never   • Sexual activity: Defer         PHYSICAL EXAM    (up to 7 for level 4, 8 or more for level 5)     Vitals:    21 0200 21 0230 21 0300 21 0318   BP: 148/67 106/79 135/57 151/77   BP Location:    Right arm   Patient Position:    Lying   Pulse: 60 61 61 57   Resp:    18   Temp:    97.5 °F (36.4 °C)   TempSrc:    Axillary   SpO2: 100% 98% 99% 94%   Weight:    57.6 kg (126 lb 14.4 oz)   Height:           Physical Exam  General: Awake, alert, no acute distress.  HEENT: Pupils are equally round and reactive to light, EOMI, conjunctivae clear, sclerae white, there is no injection no icterus.  Oral mucosa is moist, no exudate. Uvula is midline. No malocclusion or tenderness over the mandible. There is no hemotympanum, gordillo sign, or raccoon eyes.  Neck: Neck is supple, full range of motion, trachea midline. No midline tenderness.  Cardiac: Heart regular rate, rhythm, no murmurs, rubs, or gallops. Peripheral pulses are 2+ throughout.  Lungs: Lungs are clear to auscultation, there is no wheezing, rhonchi, or rales. There is no  use of accessory muscles.  Chest wall: There is no tenderness to palpation over the chest wall or over ribs. There are no chest wall ecchymoses.  Abdomen: Abdomen is soft, nontender, nondistended. There are no firm or pulsatile masses, no rebound rigidity or guarding. No abdominal wall ecchymoses.  Musculoskeletal: Moderate tenderness over the right hip.  No other area of focal bony tenderness.  Distally, pulses are intact with no motor or sensory dysfunction.  Neuro: Motor intact, sensory intact, level of consciousness is normal.  Dermatology: Skin is warm and dry  Psych: Mentation is grossly normal, cognition is grossly normal. Affect is appropriate.        DIAGNOSTIC RESULTS     EKG: All EKG's are interpreted by the Emergency Department Physician who either signs or Co-signs this chart in the absence of a cardiologist.    ECG 12 Lead   Final Result   Test Reason : HIP  INJURY   Blood Pressure :   */*   mmHG   Vent. Rate :  56 BPM     Atrial Rate :  56 BPM      P-R Int : 234 ms          QRS Dur : 136 ms       QT Int : 458 ms       P-R-T Axes : 104   4  77 degrees      QTc Int : 441 ms      ** Poor data quality, interpretation may be adversely affected   Sinus bradycardia with 1st degree AV block   Left bundle branch block   Abnormal ECG   When compared with ECG of 21-DEC-2020 03:50,   MD interval has increased   Minimal criteria for Anterior infarct are no longer present   Nonspecific T wave abnormality no longer evident in Inferior leads   Nonspecific T wave abnormality, improved in Lateral leads   QT has shortened   Confirmed by CLEVELAND RUIZ ([id number]) on 9/18/2021 4:38:40 AM      Referred By: EDMD           Confirmed By: CLEVELAND RUIZ          RADIOLOGY:   Non-plain film images such as CT, Ultrasound and MRI are read by the radiologist. Plain radiographic images are visualized and preliminarily interpreted by the emergency physician with the below findings:      [x] Radiologist's Report Reviewed:  XR Chest 1  View   Final Result   No acute cardiopulmonary findings.      Signer Name: Axel Hernandez MD    Signed: 9/18/2021 2:16 AM    [facility]     [facility]      XR Hip With or Without Pelvis 2 - 3 View Right   Final Result   Acute intertrochanteric right proximal femur fracture.      Signer Name: Axel Hernandez MD    Signed: 9/18/2021 1:57 AM    Workstation [facility]     [facility]      CT Head Without Contrast   Final Result   Senescent changes without acute abnormality.      Signer Name: Axel Hernandez MD    Signed: 9/18/2021 1:32 AM    Workstation [facility]: [facility]     [facility]            ED BEDSIDE ULTRASOUND:   Performed by ED Physician - none    LABS:    I have reviewed and interpreted all of the currently available lab results from this visit (if applicable):  Results for orders placed or performed during the hospital encounter of 09/18/21   COVID-19, [facility] IN-HOUSE,NASAL Swab (NO TRANSPORT MEDIA) 2 HR TAT - Swab, Nasopharynx    Specimen: Nasopharynx; Swab   Result Value Ref Range    COVID19 Presumptive Negative Presumptive Negative - Ref. Range   Comprehensive Metabolic Panel    Specimen: Blood   Result Value Ref Range    Glucose 129 (H) 65 - 99 mg/dL    BUN 36 (H) 8 - 23 mg/dL    Creatinine 1.12 0.76 - 1.27 mg/dL    Sodium 139 136 - 145 mmol/L    Potassium 4.5 3.5 - 5.2 mmol/L    Chloride 104 98 - 107 mmol/L    CO2 22.0 22.0 - 29.0 mmol/L    Calcium 9.0 8.2 - 9.6 mg/dL    Total Protein 6.9 6.0 - 8.5 g/dL    Albumin 3.60 3.50 - 5.20 g/dL    ALT (SGPT) 15 1 - 41 U/L    AST (SGOT) 20 1 - 40 U/L    Alkaline Phosphatase 113 39 - 117 U/L    Total Bilirubin 0.2 0.0 - 1.2 mg/dL    eGFR Non African Amer 61 >60 mL/min/1.73    Globulin 3.3 gm/dL    A/G Ratio 1.1 g/dL    BUN/Creatinine Ratio 32.1 (H) 7.0 - 25.0    Anion Gap 13.0 5.0 - 15.0 mmol/L   CBC Auto Differential    Specimen: Blood   Result Value Ref Range    WBC  12.20 (H) 3.40 - 10.80 10*3/mm3    RBC 3.05 (L) 4.14 - 5.80 10*6/mm3    Hemoglobin 8.8 (L) 13.0 - 17.7 g/dL    Hematocrit 28.0 (L) 37.5 - 51.0 %    MCV 91.8 79.0 - 97.0 fL    MCH 28.9 26.6 - 33.0 pg    MCHC 31.4 (L) 31.5 - 35.7 g/dL    RDW 18.9 (H) 12.3 - 15.4 %    RDW-SD 63.5 (H) 37.0 - 54.0 fl    MPV 10.0 6.0 - 12.0 fL    Platelets 349 140 - 450 10*3/mm3    Neutrophil % 67.3 42.7 - 76.0 %    Lymphocyte % 20.2 19.6 - 45.3 %    Monocyte % 6.4 5.0 - 12.0 %    Eosinophil % 5.3 0.3 - 6.2 %    Basophil % 0.4 0.0 - 1.5 %    Immature Grans % 0.4 0.0 - 0.5 %    Neutrophils, Absolute 8.21 (H) 1.70 - 7.00 10*3/mm3    Lymphocytes, Absolute 2.46 0.70 - 3.10 10*3/mm3    Monocytes, Absolute 0.78 0.10 - 0.90 10*3/mm3    Eosinophils, Absolute 0.65 (H) 0.00 - 0.40 10*3/mm3    Basophils, Absolute 0.05 0.00 - 0.20 10*3/mm3    Immature Grans, Absolute 0.05 0.00 - 0.05 10*3/mm3    nRBC 0.0 0.0 - 0.2 /100 WBC   ECG 12 Lead   Result Value Ref Range    QT Interval 458 ms    QTC Interval 441 ms        All other labs were within normal range or not returned as of this dictation.      EMERGENCY DEPARTMENT COURSE and DIFFERENTIAL DIAGNOSIS/MDM:   Vitals:    Vitals:    09/18/21 0200 09/18/21 0230 09/18/21 0300 09/18/21 0318   BP: 148/67 106/79 135/57 151/77   BP Location:    Right arm   Patient Position:    Lying   Pulse: 60 61 61 57   Resp:    18   Temp:    97.5 °F (36.4 °C)   TempSrc:    Axillary   SpO2: 100% 98% 99% 94%   Weight:    57.6 kg (126 lb 14.4 oz)   Height:           ED Course as of Sep 18 0439   Sat Sep 18, 2021   0211 Spoke w/ Dr. Kennedy who agrees to consult on the patient.    [NS]   0215 Spoke with Dr. Jean who accepts the patient for admission.  On reassessment, the patient continues to complain of no pain and when asked does not want any pain medication.  He remains neurovascularly intact on reexamination.  Work-up is demonstrative of right intertrochanteric hip fracture without any evidence of additional traumatic  injury based on complete history and physical examination.  Also obtain a CT head given somewhat limited history, however this was also negative for any evidence of traumatic injury including intracranial hemorrhage.  Orthopedic surgery was consulted and patient will be admitted to the hospitalist service.    [NS]      ED Course User Index  [NS] Edgardo Jones MD         MEDICATIONS ADMINISTERED IN ED:  Medications   morphine injection 2 mg (0 mg Intravenous Hold 9/18/21 0234)   HYDROcodone-acetaminophen (NORCO) 5-325 MG per tablet 1 tablet (1 tablet Oral Given 9/18/21 0228)   acetaminophen (TYLENOL) tablet 650 mg (has no administration in time range)   aspirin chewable tablet 81 mg (has no administration in time range)   atorvastatin (LIPITOR) tablet 20 mg (has no administration in time range)   docusate sodium (COLACE) capsule 100 mg (has no administration in time range)   lisinopril (PRINIVIL,ZESTRIL) tablet 5 mg (has no administration in time range)   melatonin tablet 5 mg (has no administration in time range)   metoprolol tartrate (LOPRESSOR) half tablet 12.5 mg (has no administration in time range)   palliative care oral rinse (has no administration in time range)   pantoprazole (PROTONIX) EC tablet 40 mg (has no administration in time range)   PARoxetine (PAXIL) tablet 20 mg (has no administration in time range)   QUEtiapine (SEROquel) tablet 125 mg (has no administration in time range)   QUEtiapine (SEROquel) tablet 75 mg (has no administration in time range)   tamsulosin (FLOMAX) 24 hr capsule 0.4 mg (has no administration in time range)   sodium chloride 0.9 % flush 10 mL (has no administration in time range)   sodium chloride 0.9 % flush 10 mL (has no administration in time range)   dextrose (GLUTOSE) oral gel 15 g (has no administration in time range)   dextrose (D50W) 25 g/ 50mL Intravenous Solution 25 g (has no administration in time range)   glucagon (human recombinant) (GLUCAGEN DIAGNOSTIC)  injection 1 mg (has no administration in time range)   ondansetron (ZOFRAN) injection 4 mg (has no administration in time range)   lactated ringers infusion (75 mL/hr Intravenous New Bag 9/18/21 9206)         FINAL IMPRESSION      1. Closed displaced intertrochanteric fracture of right femur, initial encounter (CMS/[facility])    2. History of dementia          DISPOSITION/PLAN     ED Disposition     ED Disposition Condition Comment    Decision to Admit  Level of Care: Telemetry [5]   Diagnosis: Closed right hip fracture (CMS/[facility]) [[id number]]   Admitting Physician: MARCO TURNER [[id number]]   Attending Physician: MARCO TURNER [[id number]]   Bed Request Comments: marija Jones MD  Attending Emergency Physician               Edgardo Jones MD  09/18/21 0499

## 2021-09-18 NOTE — CONSULTS
Orthopedic Consult      Patient: Alexys Freeman    Date of Admission: 9/18/2021 12:23 AM    YOB: 1924    Medical Record Number: [id number]    Attending Physician: Emmy Browne MD    Consulting Physician: Janes Kennedy MD      Chief Complaint(s): Closed right hip fracture (CMS/HCC) [S72.001A]      History of Present Illness: 89 y.o. male admitted to [facility] with Closed right hip fracture (CMS/HCC) [S72.001A]. I was consulted for further evaluation and treatment of Right hip fracture.  This is a 89-year-old patient with dementia who lost his balance and fell yesterday evening falling onto his right hip.  He was unable to ambulate afterwards.  He was brought to the ER for evaluation and diagnosed with a right intertrochanteric hip fracture.  He denies pain elsewhere.  No associated loss of consciousness with the fall.  He is isolating the pain to the right hip region.  Movement of the hip increases his pain.  Immobilization eases pain.  He denies numbness or tingling in the extremity.  At baseline he uses a walker to assist with ambulation.  He does have a history of a chronically infected right total knee arthroplasty that is being treated by Dr. Ravin Caceres with antibiotic suppression.     No Known Allergies     Home Medications:  Medications Prior to Admission   Medication Sig Dispense Refill Last Dose   • acetaminophen (TYLENOL) 325 MG tablet Take 2 tablets by mouth Every 4 (Four) Hours As Needed for Mild Pain .      • aspirin 81 MG chewable tablet Chew 1 tablet Daily.      • atorvastatin (LIPITOR) 20 MG tablet Take 1 tablet by mouth Every Night.      • cetirizine (zyrTEC) 10 MG tablet Take 10 mg by mouth Daily As Needed for Allergies.      • docusate sodium 100 MG capsule Take 1 capsule by mouth 2 (Two) Times a Day As Needed for Constipation.      • HYDROcodone-acetaminophen (NORCO) 5-325 MG per tablet Take 1 tablet by mouth Every 6 (Six) Hours As Needed.      • lisinopril  (PRINIVIL,ZESTRIL) 5 MG tablet Take 1 tablet by mouth Daily.      • melatonin 5 MG tablet tablet Take 1 tablet by mouth Every Night.      • metoprolol tartrate (LOPRESSOR) 25 MG tablet Take 0.5 tablets by mouth Every 12 (Twelve) Hours.      • nitroglycerin (NITROSTAT) 0.4 MG SL tablet Place 1 tablet under the tongue Every 5 (Five) Minutes As Needed for Chest Pain. Take no more than 3 doses in 15 minutes.  12    • PALLIATIVE CARE ORAL RINSE, BRYANT, Apply 5 mL to the mouth or throat Every 6 (Six) Hours.      • PARoxetine (PAXIL) 20 MG tablet Take 20 mg by mouth Every Morning.      • QUEtiapine (SEROquel) 25 MG tablet Take 5 tablets by mouth every night at bedtime.      • QUEtiapine (SEROquel) 25 MG tablet Take 3 tablets by mouth Every Morning.      • QUEtiapine (SEROquel) 25 MG tablet Take 1 tablet by mouth 2 (Two) Times a Day As Needed (agitation or anxiety).      • tamsulosin (FLOMAX) 0.4 MG capsule 24 hr capsule Take 1 capsule by mouth Every Night.      • metFORMIN ER (GLUCOPHAGE-XR) 500 MG 24 hr tablet Take 500 mg by mouth Daily With Breakfast.      • pantoprazole (PROTONIX) 40 MG EC tablet Take 1 tablet by mouth Daily.          Current Medications:  Scheduled Meds:aspirin, 81 mg, Oral, Daily  atorvastatin, 20 mg, Oral, Nightly  lisinopril, 5 mg, Oral, Daily  melatonin, 5 mg, Oral, Nightly  [START ON 9/19/2021] metoprolol tartrate, 12.5 mg, Oral, Q12H  palliative care oral rinse, 5 mL, Mouth/Throat, Q6H  pantoprazole, 40 mg, Oral, Daily  PARoxetine, 20 mg, Oral, QAM  QUEtiapine, 125 mg, Oral, Nightly  QUEtiapine, 75 mg, Oral, QAM  sodium chloride, 10 mL, Intravenous, Q12H  [START ON 9/19/2021] tamsulosin, 0.4 mg, Oral, Nightly      Continuous Infusions:lactated ringers, 75 mL/hr, Last Rate: 75 mL/hr (09/18/21 0402)      PRN Meds:.•  acetaminophen  •  dextrose  •  dextrose  •  docusate sodium  •  glucagon (human recombinant)  •  HYDROcodone-acetaminophen  •  Morphine  •  ondansetron  •  sodium chloride    Past  Medical History:   Diagnosis Date   • Anxiety    • BPH (benign prostatic hyperplasia)    • CHF (congestive heart failure) (CMS/[facility])    • Dementia (CMS/[facility])    • Depression    • Diabetes mellitus (CMS/[facility])    • Hyperlipidemia    • Hypertension    • Myocardial infarction (CMS/[facility])    • UTI (urinary tract infection)         Past Surgical History:   Procedure Laterality Date   • KIDNEY STONE SURGERY     • REPLACEMENT TOTAL KNEE BILATERAL          Social History     Occupational History   • Not on file   Tobacco Use   • Smoking status: Former Smoker     Quit date: 10/31/1979     Years since quittin.9   Substance and Sexual Activity   • Alcohol use: Not Currently     Comment: ocassionally has a cocktail   • Drug use: Never   • Sexual activity: Defer      Social History     Social History Narrative   • Not on file        Family History   Problem Relation Age of Onset   • Hypertension Mother    • Hypertension Father        Review of Systems:   General: negative for - chills, fatigue, fever, malaise or night sweats  Psychological: negative for - behavioral disorder, hostility, irritability, mood swings, obsessive thoughts or suicidal ideation  Ophthalmic: negative for - blurry vision, double vision or eye pain  HEENT: negative for - headaches, hearing change, sinus pain, sore throat or visual changes  Hematological and Lymphatic: negative for - bleeding problems, jaundice, night sweats, pallor or swollen lymph nodes  Endocrine: negative for - malaise/lethargy, mood swings, palpitations, polydipsia/polyuria, skin changes or unexpected weight changes  Respiratory: negative for - cough, shortness of breath or wheezing  Cardiovascular: negative for - chest pain, dyspnea on exertion, palpitations or shortness of breath  Gastrointestinal: negative for - abdominal pain, change in bowel habits, change in stools, constipation, gas/bloating or stool incontinence  Genitourinary: negative for - dysuria, genital discharge, nocturia,  pelvic pain or scrotal mass/pain  Musculoskeletal: positive for - pain in hip - right  Neurological: negative for - behavioral changes, headaches, speech problems or visual changes  Dermatological: negative for hair changes, lumps, pruritus and skin lesion changes    Physical Exam: 89 y.o. male    GENERAL APPEARANCE: alert, disoriented, confused and cooperative  Vitals:    09/18/21 0318 09/18/21 0500 09/18/21 0608 09/18/21 0733   BP: 151/77   109/59   BP Location: Right arm   Right arm   Patient Position: Lying   Lying   Pulse: 57 67 62 71   Resp: 18   16   Temp: 97.5 °F (36.4 °C)   97.5 °F (36.4 °C)   TempSrc: Axillary   Axillary   SpO2: 94%      Weight: 57.6 kg (126 lb 14.4 oz)      Height:         PSYCH: normal affect  HEAD: Normocephalic, without obvious abnormality, atraumatic  EYES: No icterus, corneas clear, PERRLA  CARDIOVASCULAR: pulses palpable and equal bilaterally. Capillary refill less than 2 seconds  LUNGS:  breathing nonlabored  ABDOMEN: Soft, nontender, nondistended  BACK: No C-T- L spine tenderness  EXTREMITIES: no clubbing, cyanosis  MUSCULOSKELETAL:    Left upper extremity: Full painless range of motion of shoulder, elbow, wrist, and digits.  Nontender to palpation throughout the extremity.  Intact EPL, FPL, EDC, FDP, FDS, interosseous, wrist flexion, wrist extension, biceps, triceps, and deltoid. Sensation intact light touch to median, radial, ulnar, and axillary nerves. Palpable radial pulse.  Skin is intact without significant lesions or wounds.    Right upper extremity: Full painless range of motion of shoulder, elbow, wrist, and digits. Nontender to palpation throughout the extremity.  Intact EPL, FPL, EDC, FDP, FDS, interosseous, wrist flexion, wrist extension, biceps, triceps, and deltoid. Sensation intact light touch to median, radial, ulnar, and axillary nerves. Palpable radial pulse.  Skin is intact without significant lesions or wounds.    Pelvis: Stable to AP and lateral  compression.    Left lower extremity: Full, painless range of motion of hip, knee, ankle, toes.  Nontender to palpation extremity.  Negative pain with logroll. Intact EHL, FHL, tibialis anterior, and gastrocsoleus. Sensation intact to light touch to deep peroneal, superficial peroneal, sural, saphenous, tibial nerves. Palpable DP and PT pulses. Skin -intact. Edema -none.    Right lower extremity: Full, painless range of motion of ankle, toes. Range of motion is limited at hip and knee secondary to pain.  Positive pain with logroll.  Patient's leg is shortened and externally rotated relative to contralateral side.  Focally tender to palpation about the proximal femur.  Nontender to palpation from mid femur distally to the remainder of the extremity.  There is a chronic knee effusion is present and unchanged compared to baseline.  Intact EHL, FHL, tibialis anterior, and gastrocsoleus. Sensation intact to light touch to deep peroneal, superficial peroneal, sural, saphenous, tibial nerves. Palpable DP and PT pulses. Skin -intact. Edema -none.      Diagnostic Tests:    Admission on 09/18/2021   Component Date Value Ref Range Status   • Glucose 09/18/2021 129* 65 - 99 mg/dL Final   • BUN 09/18/2021 36* 8 - 23 mg/dL Final   • Creatinine 09/18/2021 1.12  0.76 - 1.27 mg/dL Final   • Sodium 09/18/2021 139  136 - 145 mmol/L Final   • Potassium 09/18/2021 4.5  3.5 - 5.2 mmol/L Final    Slight hemolysis detected by analyzer. Results may be affected.   • Chloride 09/18/2021 104  98 - 107 mmol/L Final   • CO2 09/18/2021 22.0  22.0 - 29.0 mmol/L Final   • Calcium 09/18/2021 9.0  8.2 - 9.6 mg/dL Final   • Total Protein 09/18/2021 6.9  6.0 - 8.5 g/dL Final   • Albumin 09/18/2021 3.60  3.50 - 5.20 g/dL Final   • ALT (SGPT) 09/18/2021 15  1 - 41 U/L Final   • AST (SGOT) 09/18/2021 20  1 - 40 U/L Final   • Alkaline Phosphatase 09/18/2021 113  39 - 117 U/L Final   • Total Bilirubin 09/18/2021 0.2  0.0 - 1.2 mg/dL Final   • eGFR Non   Amer 09/18/2021 61  >60 mL/min/1.73 Final   • Globulin 09/18/2021 3.3  gm/dL Final   • A/G Ratio 09/18/2021 1.1  g/dL Final   • BUN/Creatinine Ratio 09/18/2021 32.1* 7.0 - 25.0 Final   • Anion Gap 09/18/2021 13.0  5.0 - 15.0 mmol/L Final   • QT Interval 09/18/2021 458  ms Final   • QTC Interval 09/18/2021 441  ms Final   • WBC 09/18/2021 12.20* 3.40 - 10.80 10*3/mm3 Final   • RBC 09/18/2021 3.05* 4.14 - 5.80 10*6/mm3 Final   • Hemoglobin 09/18/2021 8.8* 13.0 - 17.7 g/dL Final   • Hematocrit 09/18/2021 28.0* 37.5 - 51.0 % Final   • MCV 09/18/2021 91.8  79.0 - 97.0 fL Final   • MCH 09/18/2021 28.9  26.6 - 33.0 pg Final   • MCHC 09/18/2021 31.4* 31.5 - 35.7 g/dL Final   • RDW 09/18/2021 18.9* 12.3 - 15.4 % Final   • RDW-SD 09/18/2021 63.5* 37.0 - 54.0 fl Final   • MPV 09/18/2021 10.0  6.0 - 12.0 fL Final   • Platelets 09/18/2021 349  140 - 450 10*3/mm3 Final   • Neutrophil % 09/18/2021 67.3  42.7 - 76.0 % Final   • Lymphocyte % 09/18/2021 20.2  19.6 - 45.3 % Final   • Monocyte % 09/18/2021 6.4  5.0 - 12.0 % Final   • Eosinophil % 09/18/2021 5.3  0.3 - 6.2 % Final   • Basophil % 09/18/2021 0.4  0.0 - 1.5 % Final   • Immature Grans % 09/18/2021 0.4  0.0 - 0.5 % Final   • Neutrophils, Absolute 09/18/2021 8.21* 1.70 - 7.00 10*3/mm3 Final   • Lymphocytes, Absolute 09/18/2021 2.46  0.70 - 3.10 10*3/mm3 Final   • Monocytes, Absolute 09/18/2021 0.78  0.10 - 0.90 10*3/mm3 Final   • Eosinophils, Absolute 09/18/2021 0.65* 0.00 - 0.40 10*3/mm3 Final   • Basophils, Absolute 09/18/2021 0.05  0.00 - 0.20 10*3/mm3 Final   • Immature Grans, Absolute 09/18/2021 0.05  0.00 - 0.05 10*3/mm3 Final   • nRBC 09/18/2021 0.0  0.0 - 0.2 /100 WBC Final   • Procalcitonin 09/18/2021 0.07  0.00 - 0.25 ng/mL Final   • COVID19 09/18/2021 Presumptive Negative  Presumptive Negative - Ref. Range Final   • Color, UA 09/18/2021 Yellow  Yellow, Straw Final   • Appearance,  09/18/2021 Clear  Clear Final   • pH, UA 09/18/2021 6.5  5.0 - 8.0 Final    • Specific Gravity, UA 09/18/2021 1.018  1.001 - 1.030 Final   • Glucose, UA 09/18/2021 Negative  Negative Final   • Ketones, UA 09/18/2021 Negative  Negative Final   • Bilirubin, UA 09/18/2021 Negative  Negative Final   • Blood, UA 09/18/2021 Trace* Negative Final   • Protein, UA 09/18/2021 30 mg/dL (1+)* Negative Final   • Leuk Esterase, UA 09/18/2021 Moderate (2+)* Negative Final   • Nitrite, UA 09/18/2021 Negative  Negative Final   • Urobilinogen, UA 09/18/2021 1.0 E.U./dL  0.2 - 1.0 E.U./dL Final   • RBC, UA 09/18/2021 7-12* None Seen, 0-2 /HPF Final   • WBC, UA 09/18/2021 21-30* None Seen, 0-2 /HPF Final   • Bacteria, UA 09/18/2021 None Seen  None Seen, Trace /HPF Final   • Squamous Epithelial Cells, UA 09/18/2021 0-2  None Seen, 0-2 /HPF Final   • Hyaline Casts, UA 09/18/2021 0-6  0 - 6 /LPF Final   • Methodology 09/18/2021 Automated Microscopy   Final   • Glucose 09/18/2021 131* 70 - 130 mg/dL Final    Meter: [id number] : [id number] Atul Gaona       CT Head Without Contrast    Result Date: 9/18/2021  Narrative: CT Head WO HISTORY: Fall today. TECHNIQUE: Axial unenhanced head CT with multiplanar reformats. Radiation dose reduction techniques included automated exposure control or exposure modulation based on body size. Count of known CT and cardiac nuc med studies performed in previous 12 months: 2. COMPARISON: 12/21/2020 FINDINGS: Ventricular size and configuration are normal. No acute infarct or hemorrhage is identified. There are no masses. There is no skull fracture.  There is atrophy with advanced chronic small vessel ischemic disease in the white matter. There is an old lacunar infarct in the left corona radiata. Atherosclerotic calcifications are noted in the carotid siphons and distal vertebral arteries.     Impression: Senescent changes without acute abnormality. Signer Name: Axel Hernandez MD  Signed: 9/18/2021 1:32 AM  Workstation Name: [email or web address]  [facility]  Chest 1 View    Result Date: 9/18/2021  Narrative: CR Chest 1 Vw INDICATION: Fall. Hip fracture. COMPARISON:  12/16/2020 FINDINGS: Portable AP view(s) of the chest.  The heart and mediastinal contours are normal. The lungs are clear. No pneumothorax or pleural effusion. Atherosclerotic calcifications are noted in the aorta.     Impression: No acute cardiopulmonary findings. Signer Name: Axel Hernandez MD  Signed: 9/18/2021 2:16 AM  Workstation Name: [facility]  [facility]    XR Hip With or Without Pelvis 2 - 3 View Right    Result Date: 9/18/2021  Narrative: CR Hip Uni Comp Min 2 Vws RT INDICATION: Hip pain after fall. COMPARISON: Right femur radiographs 10/31/2018 FINDINGS: AP pelvis and 2 view(s) of the right hip.  There is an acute comminuted intertrochanteric fracture of the right proximal femur. No hip dislocation is identified. Patient is osteopenic. There is no sacroiliac joint diastasis. No additional fractures are seen. Note is made of atherosclerotic disease.      Impression: Acute intertrochanteric right proximal femur fracture. Signer Name: Axel Hernandez MD  Signed: 9/18/2021 1:57 AM  Workstation Name: [facility]  [facility]    AP pelvis and right hip radiographs were personally interpreted.  Radiographs demonstrate a displaced right intertrochanteric hip fracture with comminution.    Assessment:  Patient Active Problem List   Diagnosis   • Diabetes mellitus (CMS/HCC)   • Essential hypertension   • LBBB (left bundle branch block)   • Metabolic encephalopathy   • Anemia   • Closed right hip fracture (CMS/HCC)   • Anxiety disorder   • BPH without obstruction/lower urinary tract symptoms   • Dementia without behavioral disturbance (CMS/HCC)   • Diastolic CHF, chronic (CMS/HCC)   • History of myocardial infarction   • Leukocytosis           Plan:  The patient has clinical and radiographic evidence of right intertrochanteric hip fracture.  I had an  extensive discussion with the patient/family regarding treatment options for this injury taking into account the nature of the diagnosis, the patient's prior level of cognitive and physical function, medical comorbidities, and goals for treatment.  Operative and nonoperative options as well as alternatives were presented and the risks and benefits of each option were discussed.  After extensive discussion, the patient/family elected to proceed with operative fixation of right intertrochanteric hip fracture with cephalomedullary nail with the goal to improve patient function, decrease pain, and restore mobility. The risks, benefits, potential complications, and alternatives were again discussed with the patient in detail. Risks included but were not limited to bleeding, infection, anesthesia risks, damage to neurovascular structures, nonunion, malunion, loss of fixation, hardware failure, avascular necrosis, pain, continued pain, iatrogenic fracture, possible need for future surgery including the potential for amputation, blood clots, myocardial infarction, stroke, and death. Annetta-operative blood management and the potential for blood transfusion were discussed with risks and options clearly outlined. Specific details of the surgical procedure, hospitalization, recovery, rehabilitation, and long-term precautions were also presented. Pre-operative teaching was provided. Implant/prosthesis and equipment selection was outlined, and the many options available were explained; the final choice will be made at the time of the procedure to match the anatomy and condition of the bone, ligaments, tendons, and muscles. Given this instruction, the patient elected to proceed with the operative fixation right intertrochanteric hip fracture.    N.p.o.  Plan for OR today for operative fixation right intertrochanteric hip fracture  Hold DVT chemoprophylaxis  Obtain informed consent    Date: 9/18/2021    Janes Kennedy MD

## 2021-09-18 NOTE — H&P
"    [facility] Medicine Services  HISTORY AND PHYSICAL    Patient Name: Alexys Freeman  : 10/17/1924  MRN: [id number]  Primary Care Physician: Phiilp Calixto MD  Date of admission: 2021      Subjective   Subjective     Chief Complaint:  Right hip pain    HPI:  Alexys Freeman is a 89 y.o. male with past medical history of anxiety disorder, BPH, mild dementia, chronic diastolic CHF, history of coronary artery disease with myocardial infarction, chronic anemia, essential hypertension who presents from [facility] after mechanical fall tonight with complaints of right hip pain.    Patient presents the ER tonight after mechanical fall at [facility]. He states \" I just slipped on that darn hard slick floor\". Denies hitting his head or any loss of consciousness.  Normally ambulates around the facility without a walker.  He reports initially had 10/10 pain and inability to bear weight after the fall. Now 2/10 pain as long as he doesn't move.  Worse with movement. Constant. Worsening.  Denies any recent fever, chills, cough, dysuria, abdominal pain.      Hx otherwise limited secondary to patient's mild dementia.         COVID Details:    Symptoms:    [x] NONE [] Fever []  Cough [] Shortness of breath [] Change in taste/smell      The patient has a COVID HM Topic on their chart, and they are fully vaccinated.      Review of Systems   Gen- No fevers, chills  CV- No chest pain, palpitations  Resp- No cough, dyspnea  GI- No N/V/D, abd pain      All other systems reviewed and are negative.     Personal History     Past Medical History:   Diagnosis Date   • Anxiety    • BPH (benign prostatic hyperplasia)    • CHF (congestive heart failure) (CMS/HCC)    • Dementia (CMS/[facility])    • Depression    • Diabetes mellitus (CMS/HCC)    • Hyperlipidemia    • Hypertension    • Myocardial infarction (CMS/HCC)    • UTI (urinary tract infection)        Past Surgical History: "   Procedure Laterality Date   • KIDNEY STONE SURGERY     • REPLACEMENT TOTAL KNEE BILATERAL         Family History:  family history includes Hypertension in his father and mother. Otherwise pertinent FHx was reviewed and unremarkable.     Social History:  reports that he quit smoking about 41 years ago. He does not have any smokeless tobacco history on file. He reports previous alcohol use. He reports that he does not use drugs.  Social History     Social History Narrative   • Not on file       Medications:  Available home medication information reviewed.  (Not in a hospital admission)      No Known Allergies    Objective   Objective     Vital Signs:   Temp:  [98.1 °F (36.7 °C)] 98.1 °F (36.7 °C)  Heart Rate:  [58-61] 60  Resp:  [18] 18  BP: (125-148)/(63-70) 148/67       Physical Exam   Constitutional: Awake, alert, slightly anxious  Eyes: PERRLA, sclerae anicteric, no conjunctival injection  HENT: NCAT, mucous membranes moist  Neck: Supple, no thyromegaly, no lymphadenopathy, trachea midline  Respiratory: Clear to auscultation bilaterally, nonlabored respirations   Cardiovascular: RRR, murmur present, palpable pedal pulses bilaterally  Gastrointestinal: Positive bowel sounds, soft, nontender, nondistended  Musculoskeletal: No bilateral ankle edema, no clubbing or cyanosis to extremities, right lower extremity shortened and externally rotated  Psychiatric: Appropriate affect, cooperative  Neurologic: Oriented x 3, strength symmetric in all extremities, Cranial Nerves grossly intact to confrontation, speech clear  Skin: No rashes      Result Review:  I have personally reviewed the results from the time of this admission to 9/18/2021 02:24 EDT and agree with these findings:  [x]  Laboratory  []  Microbiology  [x]  Radiology  [x]  EKG/Telemetry   []  Cardiology/Vascular   []  Pathology  []  Old records  []  Other:  Most notable findings include: EKG with first-degree AV block, leukocytosis 12.2, hemoglobin baseline at  8.8, plain film showing intertrochanteric fracture on the right      LAB RESULTS:      Lab 09/18/21  0147   WBC 12.20*   HEMOGLOBIN 8.8*   HEMATOCRIT 28.0*   PLATELETS 349   NEUTROS ABS 8.21*   IMMATURE GRANS (ABS) 0.05   LYMPHS ABS 2.46   MONOS ABS 0.78   EOS ABS 0.65*   MCV 91.8                             UA    Urinalysis 12/16/20 12/16/20 6/26/21 6/26/21    1444 1444 1530 1530   Squamous Epithelial Cells, UA  0-2  0-2   Specific Gravity, UA 1.019  1.024    Ketones, UA Negative  Negative    Blood, UA Negative  Negative    Leukocytes, UA Small (1+) (A)  Trace (A)    Nitrite, UA Negative  Negative    RBC, UA  3-6 (A)  13-20 (A)   WBC, UA  21-30 (A)  13-20 (A)   Bacteria, UA  None Seen  None Seen   (A) Abnormal value              Microbiology Results (last 10 days)     ** No results found for the last 240 hours. **          CT Head Without Contrast    Result Date: 9/18/2021  CT Head WO HISTORY: Fall today. TECHNIQUE: Axial unenhanced head CT with multiplanar reformats. Radiation dose reduction techniques included automated exposure control or exposure modulation based on body size. Count of known CT and cardiac nuc med studies performed in previous 12 months: 2. COMPARISON: 12/21/2020 FINDINGS: Ventricular size and configuration are normal. No acute infarct or hemorrhage is identified. There are no masses. There is no skull fracture.  There is atrophy with advanced chronic small vessel ischemic disease in the white matter. There is an old lacunar infarct in the left corona radiata. Atherosclerotic calcifications are noted in the carotid siphons and distal vertebral arteries.     Impression: Senescent changes without acute abnormality. Signer Name: Axel Hernandez MD  Signed: 9/18/2021 1:32 AM  Workstation [facility]: [facility]  [facility]    XR Chest 1 View    Result Date: 9/18/2021  CR Chest 1 Vw INDICATION: Fall. Hip fracture. COMPARISON:  12/16/2020 FINDINGS: Portable AP view(s) of the chest.   The heart and mediastinal contours are normal. The lungs are clear. No pneumothorax or pleural effusion. Atherosclerotic calcifications are noted in the aorta.     Impression: No acute cardiopulmonary findings. Signer Name: Axel Hernandez MD  Signed: 9/18/2021 2:16 AM  Workstation Name: [facility]  [facility]    XR Hip With or Without Pelvis 2 - 3 View Right    Result Date: 9/18/2021  CR Hip Uni Comp Min 2 Vws RT INDICATION: Hip pain after fall. COMPARISON: Right femur radiographs 10/31/2018 FINDINGS: AP pelvis and 2 view(s) of the right hip.  There is an acute comminuted intertrochanteric fracture of the right proximal femur. No hip dislocation is identified. Patient is osteopenic. There is no sacroiliac joint diastasis. No additional fractures are seen. Note is made of atherosclerotic disease.      Impression: Acute intertrochanteric right proximal femur fracture. Signer Name: Axel Hernandez MD  Signed: 9/18/2021 1:57 AM  Workstation Name: [facility]  [facility]      Results for orders placed during the hospital encounter of 12/16/20    Transthoracic Echo Complete With Contrast if Necessary Per Protocol    Interpretation Summary  · Left ventricular ejection fraction appears to be 51 - 55%. Left ventricular systolic function is low normal.  · Left ventricular diastolic function is consistent with (grade Ia w/high LAP) impaired relaxation.  · Estimated right ventricular systolic pressure from tricuspid regurgitation is normal (<35 mmHg). Calculated right ventricular systolic pressure from tricuspid regurgitation is 33 mmHg.      Assessment/Plan   Assessment & Plan     Active Hospital Problems    Diagnosis  POA   • Closed right hip fracture (CMS/HCC) [S72.001A]  Yes   • Anxiety disorder [F41.9]  Unknown   • BPH without obstruction/lower urinary tract symptoms [N40.0]  Unknown   • Dementia without behavioral disturbance (CMS/HCC) [F03.90]  Unknown   • Diastolic CHF,  chronic (CMS/[facility]) [I50.32]  Unknown   • History of myocardial infarction [I25.2]  Not Applicable   • Leukocytosis [D72.829]  Unknown   • Anemia [D64.9]  Yes   • Diabetes mellitus (CMS/[facility]) [E11.9]  Yes   • Essential hypertension [I10]  Unknown     Patient is a 89-year-old male with past medical history of anxiety disorder, BPH, mild dementia, chronic diastolic CHF, history of coronary artery disease with myocardial infarction, chronic anemia, essential hypertension who presents from [facility] after mechanical fall tonight with complaints of right hip pain.    1.  Closed right hip intratrochanteric fracture of the proximal femur  --Case discussed with Dr. Kennedy  --N.p.o.  --Pain control  --Bed alarm, fall precaution  --PT/OT    2.  Leukocytosis  --Suspect reactive secondary to above  --X-ray clear  --Check urine  --Procalcitonin    3.  Dementia, mild  --Monitor for hospital delirium  --Attempt to limit narcotics as much as possible  --Continue Seroquel    4.  Anxiety  --Continue Paxil    5.  BPH  --Continue Flomax    6.  History of coronary artery disease  --Continue aspirin, beta-blocker, statin    7.  Diabetes  --Low-dose sliding scale    DVT prophylaxis:  scds in prep for surgery      CODE STATUS:  DNR/DNI  There are no questions and answers to display.       Admission Status:  I believe this patient meets INPATIENT status due to right hip fracture.  I feel patient’s risk for adverse outcomes and need for care warrant INPATIENT evaluation and I predict the patient’s care encounter to likely last beyond 2 midnights.      Micheal Jean,   09/18/21

## 2021-09-18 NOTE — ANESTHESIA POSTPROCEDURE EVALUATION
Patient: Alexys Freeman    Procedure Summary     Date: 09/18/21 Room / Location:  [facility]    Anesthesia Start: 1022 Anesthesia Stop: 1146    Procedure: HIP TROCHANTERIC NAILING WITH INTRAMEDULLARY HIP SCREW (Right Hip) Diagnosis:       Closed right hip fracture (CMS/HCC)      (Closed right hip fracture (CMS/HCC) [[id number]])    Surgeons: Maciej Falcon MD Provider: Amarjit Palmer MD    Anesthesia Type: general ASA Status: 3          Anesthesia Type: general    Vitals  Vitals Value Taken Time   /55 09/18/21 1145   Temp 98 °F (36.7 °C) 09/18/21 1145   Pulse 49 09/18/21 1145   Resp 16 09/18/21 1145   SpO2 100 % 09/18/21 1145           Post Anesthesia Care and Evaluation    Patient location during evaluation: PACU  Level of consciousness: awake  Pain management: adequate  Airway patency: patent  Anesthetic complications: No anesthetic complications  PONV Status: none  Cardiovascular status: acceptable  Respiratory status: acceptable  Hydration status: acceptable

## 2021-09-18 NOTE — OP NOTE
HIP TROCHANTERIC NAILING WITH INTRAMEDULLARY HIP SCREW  Procedure Report    Patient Name:  Alexys Freeman  YOB: 1924    Date of Surgery:  9/18/2021     Indications:    The patient is a 89 y.o. male admitted to [facility] following fall. X-ray studies revealed a displaced right Intertrochanteric femur fracture. The patient was indicated for a Femoral nailing. Likely risk benefits of the procedure including but not limited to infection, DVT, pulmonary embolism, leg length discrepancy, recurrent dislocation, possibility of injury to nerves and vessels, and periprosthetic fractures have been discussed with the patient and family in detail. Despite the risks involved the patient elected to proceed with an informed consent was obtained. The patient was seen in the preoperative holding area and the operative extremity was marked.    Pre-op Diagnosis:   Closed right hip fracture (CMS/[facility]) [[id number]]       Post-Op Diagnosis Codes:     * Closed right hip fracture (CMS/[facility]) [S72.001A]    Procedure/CPT® Codes:      Procedure(s):  HIP TROCHANTERIC NAILING WITH INTRAMEDULLARY HIP SCREW    Staff:  Surgeon(s):  Maciej Falcon MD    Anesthesia: Choice    Estimated Blood Loss: 100 mL    Implants:    Implant Name Type Inv. Item Serial No.  Lot No. LRB No. Used Action   NAIL FEM IM TRIGEN/INTERTAN 125D 10MM 18CM - [id number] Implant NAIL FEM IM TRIGEN/INTERTAN 125D 10MM 18CM  [employer] [id number] Right 1 Implanted   SCRW LAG COMP KT  AND 95MM 2PK - [id number] Implant SCRW LAG COMP KT  AND 95MM 2PK  [employer] [id number] Right 1 Implanted   SCRW 3GEN LP 5X32.5MM - [id number] Implant SCRW 3GEN LP 5X32.5MM  [employer] [id number] Right 1 Implanted       Specimen:          None      Findings: see dictation     Complications: none    Description of Procedure:   The patient was transferred to [facility] operating room preoperative antibiotics Given IV  Prior to Skin Incision As Well As 1 G of Tranexemic Acid. Patient Received general anesthesia and was transferred to the Nohemi Table. All Bony Prominences Were Padded Adequately. The Operative Leg Was Then Prepped and Draped in the Usual Sterile Fashion. Multiple timeouts Were Done Identifying the Correct Patient Surgical Site and Planned Procedure.    Preoperative Fluoroscopic images were taken and The leg position was adjusted in order to adequately reduce the fracture with the addition of traction.    We began by making a 3-4 cm incision proximal to the greater trochanter down to the tip of the greater trochanter sharply incising the abductors in line with the muscle fibers. We then inserted a trochanteric awl in order to aid with positioning of our guidepin. Guidepin was inserted just medial to the tip of the greater trochanter and just lateral to the piriformis fossa on the AP and on the lateral in the midline of the trochanter. This was advanced under fluoroscopic imaging. Guidewire was removed opening reamer was taken down below the level of the lesser trochanter. We then the nail into place and  We then drilled for the cephalo-medullary portion of the construct with a guidepin found it to be in good position on AP and lateral views measured its depth to 100. Drilled the inferior screw to 95 placed the antirotation device and then inserted the 100 mm more proximal lag screw followed by the 95 mm lag screw. We then drilled for the interlock through the jig. Measured for a 32.5and inserted the interlock screw distally. we locked the setscrew proximally in the nail. Final x-rays were taken that showed a great reduction of the fracture and good alignment overall of the implant.  The wound was then thoroughly irrigated saline we did use of more of the injection cocktail. Betadine irrigation was used followed by saline irrigation. Soft tissue hemostasis was secured and topical TXA was used. Sponge and needle  instrument counts were correct. The fascial layer was closed with 0-vicryl followed by 2-0 Vicryl and then staples for the skin Mepilex AG dressing placed over the top. The patient was then transferred to the recovery room in stable condition patient tolerated the procedure well there were no Medications the patient adequate distal pulses and good cap refill.  The patient will be mobilized by physical therapy received antibiotic prophylaxis postoperatively for 2 more doses given the first 24 hours. The patient was started on DVT prophylaxis with 81 mg aspirin twice daily on starting postoperative day #1.  I discussed the satisfactory performance of the procedure with the patient's family and discussed the postoperative management.      Maciej Falcon MD     Date: 9/18/2021  Time: 11:45 EDT

## 2021-09-18 NOTE — ANESTHESIA PROCEDURE NOTES
Airway  Urgency: elective    Airway not difficult    General Information and Staff    Patient location during procedure: OR    Indications and Patient Condition  Indications for airway management: airway protection    Preoxygenated: yes  MILS not maintained throughout  Mask difficulty assessment: 1 - vent by mask    Final Airway Details  Final airway type: endotracheal airway      Successful airway: ETT  Cuffed: yes   Successful intubation technique: direct laryngoscopy  Endotracheal tube insertion site: oral  Blade: Janice  Blade size: 3  ETT size (mm): 7.5  Cormack-Lehane Classification: grade I - full view of glottis  Placement verified by: chest auscultation and capnometry   Number of attempts at approach: 1  Assessment: lips, teeth, and gum same as pre-op and atraumatic intubation

## 2021-09-18 NOTE — THERAPY EVALUATION
Patient Name: Alexys Freeman  : 10/17/1924    MRN: [id number]                              Today's Date: 2021       Admit Date: 2021    Visit Dx:     ICD-10-CM ICD-9-CM   1. Closed displaced intertrochanteric fracture of right femur, initial encounter (CMS/[facility])  S72.141A 820.21   2. History of dementia  Z86.59 V11.8     Patient Active Problem List   Diagnosis   • Diabetes mellitus (CMS/[facility])   • Essential hypertension   • LBBB (left bundle branch block)   • Metabolic encephalopathy   • Anemia   • Closed right hip fracture (CMS/[facility])   • Anxiety disorder   • BPH without obstruction/lower urinary tract symptoms   • Dementia without behavioral disturbance (CMS/[facility])   • Diastolic CHF, chronic (CMS/[facility])   • History of myocardial infarction   • Leukocytosis     Past Medical History:   Diagnosis Date   • Anxiety    • BPH (benign prostatic hyperplasia)    • CHF (congestive heart failure) (CMS/[facility])    • Dementia (CMS/HCC)    • Depression    • Diabetes mellitus (CMS/[facility])    • Hyperlipidemia    • Hypertension    • Myocardial infarction (CMS/[facility])    • UTI (urinary tract infection)      Past Surgical History:   Procedure Laterality Date   • KIDNEY STONE SURGERY     • REPLACEMENT TOTAL KNEE BILATERAL       General Information     Row Name 21 155          Physical Therapy Time and Intention    Document Type  evaluation  -KM     Mode of Treatment  physical therapy  -KM     Row Name 21 1555          General Information    Patient Profile Reviewed  yes  -KM     Prior Level of Function  min assist:;gait;transfer;bed mobility;ADL's estimated from RN's account of conversation with family, family not currently available  -KM     Existing Precautions/Restrictions  fall  -KM     Barriers to Rehab  previous functional deficit;cognitive status;hearing deficit  -KM     Row Name 21 1554          Living Environment    Lives With  facility resident [facility]  -     Row Name 21 1554          Home  Main Entrance    Number of Stairs, Main Entrance  none  -KM     Row Name 09/18/21 1552          Stairs Within Home, Primary    Number of Stairs, Within Home, Primary  none  -KM     Row Name 09/18/21 1552          Cognition    Orientation Status (Cognition)  oriented to;person;disoriented to;place;situation;time could not state birthday, pleasantly confused but able to follow directives and commands with frequent cues  -     Row Name 09/18/21 1552          Safety Issues, Functional Mobility    Safety Issues Affecting Function (Mobility)  safety precaution awareness;safety precautions follow-through/compliance;insight into deficits/self-awareness;awareness of need for assistance;sequencing abilities  -KM     Impairments Affecting Function (Mobility)  balance;cognition;endurance/activity tolerance;range of motion (ROM);pain  -KM     Cognitive Impairments, Mobility Safety/Performance  attention;awareness, need for assistance;problem-solving/reasoning;safety precaution awareness;safety precaution follow-through;insight into deficits/self-awareness  -       User Key  (r) = Recorded By, (t) = Taken By, (c) = Cosigned By    Initials Name Provider Type     Jennyfer Moody, PT Physical Therapist        Mobility     Row Name 09/18/21 1559          Bed Mobility    Bed Mobility  scooting/bridging;supine-sit  -KM     Scooting/Bridging [place] (Bed Mobility)  dependent (less than 25% patient effort);2 person assist  -KM     Supine-Sit [place] (Bed Mobility)  dependent (less than 25% patient effort);2 person assist  -KM     Assistive Device (Bed Mobility)  draw sheet;head of bed elevated  -     Row Name 09/18/21 1559          Transfers    Comment (Transfers)  pt transferred bed to chair with B u/e support, gait belt, toward L side due to R l/e pain and hesitancy wbing, unable to take steps  -     Row Name 09/18/21 1559          Bed-Chair Transfer    Bed-Chair [place] (Transfers)  maximum assist (25%  patient effort);2 person assist  -KM     Row Name 09/18/21 1559          Sit-Stand Transfer    Sit-Stand [place] (Transfers)  maximum assist (25% patient effort);2 person assist  -KM     Row Name 09/18/21 1559          Mobility    Extremity Weight-bearing Status  right lower extremity  -KM     Left Lower Extremity (Weight-bearing Status)  --  -KM     Right Lower Extremity (Weight-bearing Status)  weight-bearing as tolerated (WBAT)  -KM       User Key  (r) = Recorded By, (t) = Taken By, (c) = Cosigned By    Initials Name Provider Type    Jennyfer Avalos, PT Physical Therapist        Obj/Interventions     Row Name 09/18/21 1603          Range of Motion Comprehensive    Comment, General Range of Motion  R hip decreased 50% due to pain and guarding, B ankles to neutral df, B knee rom wfls in sitting  -KM     Row Name 09/18/21 1603          Strength Comprehensive (MMT)    Comment, General Manual Muscle Testing (MMT) Assessment  R hip deferred due to pain, L l/e functionally 3/5, decreased ability to MMT due to cognition  -KM     Row Name 09/18/21 1603          Balance    Balance Assessment  sitting static balance;standing static balance;standing dynamic balance  -KM     Static Sitting Balance  WNL;supported  -KM     Static Standing Balance  moderate impairment;supported  -KM     Dynamic Standing Balance  severe impairment;supported  -KM     Row Name 09/18/21 1603          Sensory Assessment (Somatosensory)    Sensory Assessment (Somatosensory)  LE sensation intact  -KM       User Key  (r) = Recorded By, (t) = Taken By, (c) = Cosigned By    Initials Name Provider Type    Jennyfer Avalos, PT Physical Therapist        Goals/Plan     Row Name 09/18/21 1609          Bed Mobility Goal 1 (PT)    Activity/Assistive Device (Bed Mobility Goal 1, PT)  bed mobility activities, all  -KM     [place] Level/Cues Needed (Bed Mobility Goal 1, PT)  moderate assist (50-74% patient effort)  -KM     Time Frame  (Bed Mobility Goal 1, PT)  10 days  -[facility] Name 09/18/21 1609          Transfer Goal 1 (PT)    Activity/Assistive Device (Transfer Goal 1, PT)  sit-to-stand/stand-to-sit;bed-to-chair/chair-to-bed;walker, rolling  -KM     [place] Level/Cues Needed (Transfer Goal 1, PT)  minimum assist (75% or more patient effort)  -KM     Row Name 09/18/21 1609          Gait Training Goal 1 (PT)    Activity/Assistive Device (Gait Training Goal 1, PT)  gait (walking locomotion);increase endurance/gait distance;decrease fall risk;walker, rolling  -KM     [place] Level (Gait Training Goal 1, PT)  moderate assist (50-74% patient effort) R WBAT  -KM     Distance (Gait Training Goal 1, PT)  75  -KM       User Key  (r) = Recorded By, (t) = Taken By, (c) = Cosigned By    Initials Name Provider Type     Jennyfer Moody, PT Physical Therapist        Clinical Impression     Row Name 09/18/21 1605          Pain    Additional Documentation  Pain Scale: FACES Pre/Post-Treatment (Group)  -KM     Row Name 09/18/21 1605          Pain Scale: Numbers Pre/Post-Treatment    Pain Intervention(s)  Cold applied;Repositioned  -KM     Row Name 09/18/21 1605          Pain Scale: FACES Pre/Post-Treatment    Pain: FACES Scale, Pretreatment  2-->hurts little bit  -KM     Posttreatment Pain Rating  6-->hurts even more  -KM     Pain Location - Side  Right  -KM     Pain Location  hip  -[facility] 09/18/21 1605          Plan of Care Review    Plan of Care Reviewed With  patient  -     Outcome Summary  PT consult initiated. Pt demonstrates decreased functional mobility with R hip pain POD#0 ORIF. Pt tolerated bed to chair with max assistX2, avoiding wt shift to R due to pain and guarding. Pt has potential to regain ambulation skills with assist. Anticipate snf for rehab  -[facility] 09/18/21 1605          Therapy Assessment/Plan (PT)    Patient/Family Therapy Goals Statement (PT)  pt did not state  -     Rehab Potential (PT)   good, to achieve stated therapy goals  -KM     Criteria for Skilled Interventions Met (PT)  yes  -KM     Row Name 09/18/21 1605          Positioning and Restraints    Pre-Treatment Position  in bed  -KM     Post Treatment Position  chair  -KM     In Chair  reclined;call light within reach;encouraged to call for assist;exit alarm on;with nsg;waffle cushion;on mechanical lift sling;compression device  -KM       User Key  (r) = Recorded By, (t) = Taken By, (c) = Cosigned By    Initials Name Provider Type    Jennyfer Avalos, PT Physical Therapist        Outcome Measures     Row Name 09/18/21 1610          How much help from another person do you currently need...    Turning from your back to your side while in flat bed without using bedrails?  2  -KM     Moving from lying on back to sitting on the side of a flat bed without bedrails?  2  -KM     Moving to and from a bed to a chair (including a wheelchair)?  2  -KM     Standing up from a chair using your arms (e.g., wheelchair, bedside chair)?  2  -KM     Climbing 3-5 steps with a railing?  1  -KM     To walk in hospital room?  1  -KM     AM-PAC 6 Clicks Score (PT)  10  -KM     Row Name 09/18/21 1610          Functional Assessment    Outcome Measure Options  AM-PAC 6 Clicks Basic Mobility (PT)  -KM       User Key  (r) = Recorded By, (t) = Taken By, (c) = Cosigned By    Initials Name Provider Type    Jennyfer Avalos, PT Physical Therapist                       Physical Therapy Education                 Title: PT OT SLP Therapies (In Progress)     Topic: Physical Therapy (In Progress)     Point: Mobility training (In Progress)     Learning Progress Summary           Patient Acceptance, E, NR by TAM at 9/18/2021 1611    Comment: discussed plan of care, ability to put wt on leg, discussed situation                   Point: Home exercise program (In Progress)     Learning Progress Summary           Patient Acceptance, E, NR by TAM at 9/18/2021 1611     Comment: discussed plan of care, ability to put wt on leg, discussed situation                   Point: Body mechanics (In Progress)     Learning Progress Summary           Patient Acceptance, E, NR by  at 9/18/2021 1611    Comment: discussed plan of care, ability to put wt on leg, discussed situation                   Point: Precautions (In Progress)     Learning Progress Summary           Patient Acceptance, E, NR by KM at 9/18/2021 1611    Comment: discussed plan of care, ability to put wt on leg, discussed situation                               User Key     Initials Effective Dates Name Provider Type Discipline     06/16/21 -  Jennyfer Moody, PT Physical Therapist PT              PT Recommendation and Plan  Planned Therapy Interventions (PT): balance training, bed mobility training, gait training, transfer training, ROM (range of motion)  Plan of Care Reviewed With: patient  Outcome Summary: PT consult initiated. Pt demonstrates decreased functional mobility with R hip pain POD#0 ORIF. Pt tolerated bed to chair with max assistX2, avoiding wt shift to R due to pain and guarding. Pt has potential to regain ambulation skills with assist. Anticipate snf for rehab     Time Calculation:   PT Charges     Row Name 09/18/21 1612             Time Calculation    Start Time  1527  -KM      PT Received On  09/18/21  -KM      PT Goal Re-Cert Due Date  09/28/21  -KM         Time Calculation- PT    Total Timed Code Minutes- PT  8 minute(s)  -KM         Timed Charges    86153 - PT Therapeutic Activity Minutes  8  -KM         Untimed Charges    PT Eval/Re-eval Minutes  47  -KM         Total Minutes    Timed Charges Total Minutes  8  -KM      Untimed Charges Total Minutes  47  -KM       Total Minutes  55  -KM        User Key  (r) = Recorded By, (t) = Taken By, (c) = Cosigned By    Initials Name Provider Type    Jennyfer Avalos, PT Physical Therapist        Therapy Charges for Today     Code Description  Service Date Service Provider Modifiers Qty    53441583222  PT THERAPEUTIC ACT EA 15 MIN 9/18/2021 Jennyfer Moody, PT GP 1    00185275703 HC PT EVAL LOW COMPLEXITY 4 9/18/2021 Jennyfer Moody, PT GP 1    51583704409 HC PT THER SUPP EA 15 MIN 9/18/2021 Jennyfer Moody, PT GP 2          PT G-Codes  Outcome Measure Options: AM-PAC 6 Clicks Basic Mobility (PT)  AM-PAC 6 Clicks Score (PT): 10    Jennyfer Moody, PT  9/18/2021

## 2021-09-18 NOTE — PLAN OF CARE
Goal Outcome Evaluation:  Plan of Care Reviewed With: patient           Outcome Summary: PT consult initiated. Pt demonstrates decreased functional mobility with R hip pain POD#0 ORIF. Pt tolerated bed to chair with max assistX2, avoiding wt shift to R due to pain and guarding. Pt has potential to regain ambulation skills with assist. Anticipate snf for rehab

## 2021-09-18 NOTE — PROGRESS NOTES
[facility] Medicine Services  ADMISSION FOLLOW-UP NOTE          Patient admitted after midnight, H&P by my partner performed earlier on today's date reviewed.  Interim findings, labs, and charting also reviewed.        [facility] Hospital Problem List has been managed and updated to include any new diagnoses:  Active Hospital Problems    Diagnosis  POA   • Closed right hip fracture (CMS/HCC) [S72.001A]  Yes   • Anxiety disorder [F41.9]  Unknown   • BPH without obstruction/lower urinary tract symptoms [N40.0]  Unknown   • Dementia without behavioral disturbance (CMS/HCC) [F03.90]  Unknown   • Diastolic CHF, chronic (CMS/HCC) [I50.32]  Unknown   • History of myocardial infarction [I25.2]  Not Applicable   • Leukocytosis [D72.829]  Unknown   • Anemia [D64.9]  Yes   • Diabetes mellitus (CMS/HCC) [E11.9]  Yes   • Essential hypertension [I10]  Unknown      Resolved Hospital Problems   No resolved problems to display.     89-year-old male with history of dementia, chronic diastolic heart failure, type 2 diabetes, hypertension, BPH.  Presented to the ED with right hip pain s/p fall, found to have right hip fracture.Patient is s/p surgery early this morning, he is currently comfortable, daughter is at bedside.    Closed right intratrochanteric fracture of the proximal femur s/p fall  -Ortho consulted, he is s/p repair, continue post-op care per Dr. Falcon  -Continue pain control, PT/OT to evaluate when appropriate    Previously well-controlled type 2 diabetes with A1c 6.2% (12/16/2021)  -FSBG's have been reviewed and currently appropriate, continue SSI    Dementia  -Seems to be a mild, at risk for hospital delirium  -Continue Seroquel qhs    - detailed assessment and plan from admission reviewed  -Otherwise continue plan of care as per admission H&P    Emmy Browne MD  09/18/21

## 2021-09-19 LAB
ANION GAP SERPL CALCULATED.3IONS-SCNC: 12 MMOL/L (ref 5–15)
BASOPHILS # BLD AUTO: 0.03 10*3/MM3 (ref 0–0.2)
BASOPHILS NFR BLD AUTO: 0.2 % (ref 0–1.5)
BUN SERPL-MCNC: 38 MG/DL (ref 8–23)
BUN/CREAT SERPL: 30.2 (ref 7–25)
CALCIUM SPEC-SCNC: 8.2 MG/DL (ref 8.2–9.6)
CHLORIDE SERPL-SCNC: 103 MMOL/L (ref 98–107)
CO2 SERPL-SCNC: 22 MMOL/L (ref 22–29)
CREAT SERPL-MCNC: 1.26 MG/DL (ref 0.76–1.27)
DEPRECATED RDW RBC AUTO: 65.1 FL (ref 37–54)
EOSINOPHIL # BLD AUTO: 0.35 10*3/MM3 (ref 0–0.4)
EOSINOPHIL NFR BLD AUTO: 2.5 % (ref 0.3–6.2)
ERYTHROCYTE [DISTWIDTH] IN BLOOD BY AUTOMATED COUNT: 19.1 % (ref 12.3–15.4)
GFR SERPL CREATININE-BSD FRML MDRD: 53 ML/MIN/1.73
GLUCOSE BLDC GLUCOMTR-MCNC: 127 MG/DL (ref 70–130)
GLUCOSE BLDC GLUCOMTR-MCNC: 135 MG/DL (ref 70–130)
GLUCOSE BLDC GLUCOMTR-MCNC: 137 MG/DL (ref 70–130)
GLUCOSE BLDC GLUCOMTR-MCNC: 154 MG/DL (ref 70–130)
GLUCOSE SERPL-MCNC: 167 MG/DL (ref 65–99)
HCT VFR BLD AUTO: 21.1 % (ref 37.5–51)
HCT VFR BLD AUTO: 26.3 % (ref 37.5–51)
HGB BLD-MCNC: 6.5 G/DL (ref 13–17.7)
HGB BLD-MCNC: 8.8 G/DL (ref 13–17.7)
IMM GRANULOCYTES # BLD AUTO: 0.05 10*3/MM3 (ref 0–0.05)
IMM GRANULOCYTES NFR BLD AUTO: 0.4 % (ref 0–0.5)
LYMPHOCYTES # BLD AUTO: 2.42 10*3/MM3 (ref 0.7–3.1)
LYMPHOCYTES NFR BLD AUTO: 17.6 % (ref 19.6–45.3)
MCH RBC QN AUTO: 29.1 PG (ref 26.6–33)
MCHC RBC AUTO-ENTMCNC: 30.8 G/DL (ref 31.5–35.7)
MCV RBC AUTO: 94.6 FL (ref 79–97)
MONOCYTES # BLD AUTO: 0.87 10*3/MM3 (ref 0.1–0.9)
MONOCYTES NFR BLD AUTO: 6.3 % (ref 5–12)
NEUTROPHILS NFR BLD AUTO: 10.03 10*3/MM3 (ref 1.7–7)
NEUTROPHILS NFR BLD AUTO: 73 % (ref 42.7–76)
NRBC BLD AUTO-RTO: 0 /100 WBC (ref 0–0.2)
PLATELET # BLD AUTO: 267 10*3/MM3 (ref 140–450)
PMV BLD AUTO: 10.1 FL (ref 6–12)
POTASSIUM SERPL-SCNC: 4.2 MMOL/L (ref 3.5–5.2)
RBC # BLD AUTO: 2.23 10*6/MM3 (ref 4.14–5.8)
SODIUM SERPL-SCNC: 137 MMOL/L (ref 136–145)
WBC # BLD AUTO: 13.75 10*3/MM3 (ref 3.4–10.8)

## 2021-09-19 PROCEDURE — 99232 SBSQ HOSP IP/OBS MODERATE 35: CPT | Performed by: INTERNAL MEDICINE

## 2021-09-19 PROCEDURE — 80048 BASIC METABOLIC PNL TOTAL CA: CPT | Performed by: ORTHOPAEDIC SURGERY

## 2021-09-19 PROCEDURE — 82962 GLUCOSE BLOOD TEST: CPT

## 2021-09-19 PROCEDURE — 85025 COMPLETE CBC W/AUTO DIFF WBC: CPT | Performed by: ORTHOPAEDIC SURGERY

## 2021-09-19 PROCEDURE — 36430 TRANSFUSION BLD/BLD COMPNT: CPT

## 2021-09-19 PROCEDURE — 85014 HEMATOCRIT: CPT | Performed by: INTERNAL MEDICINE

## 2021-09-19 PROCEDURE — 97530 THERAPEUTIC ACTIVITIES: CPT

## 2021-09-19 PROCEDURE — 25010000003 CEFAZOLIN IN DEXTROSE 2-4 GM/100ML-% SOLUTION: Performed by: ORTHOPAEDIC SURGERY

## 2021-09-19 PROCEDURE — 97110 THERAPEUTIC EXERCISES: CPT

## 2021-09-19 PROCEDURE — 85018 HEMOGLOBIN: CPT | Performed by: INTERNAL MEDICINE

## 2021-09-19 PROCEDURE — P9016 RBC LEUKOCYTES REDUCED: HCPCS

## 2021-09-19 PROCEDURE — 86900 BLOOD TYPING SEROLOGIC ABO: CPT

## 2021-09-19 RX ADMIN — QUETIAPINE FUMARATE 75 MG: 25 TABLET ORAL at 09:30

## 2021-09-19 RX ADMIN — ATORVASTATIN CALCIUM 20 MG: 20 TABLET, FILM COATED ORAL at 20:25

## 2021-09-19 RX ADMIN — SODIUM CHLORIDE, PRESERVATIVE FREE 10 ML: 5 INJECTION INTRAVENOUS at 20:31

## 2021-09-19 RX ADMIN — CEFAZOLIN SODIUM 2 G: 2 INJECTION, SOLUTION INTRAVENOUS at 01:23

## 2021-09-19 RX ADMIN — Medication 5 MG: at 20:25

## 2021-09-19 RX ADMIN — PANTOPRAZOLE SODIUM 40 MG: 40 TABLET, DELAYED RELEASE ORAL at 09:30

## 2021-09-19 RX ADMIN — ASPIRIN 81 MG: 81 TABLET, COATED ORAL at 20:25

## 2021-09-19 RX ADMIN — ACETAMINOPHEN 1000 MG: 500 TABLET, FILM COATED ORAL at 06:06

## 2021-09-19 RX ADMIN — ASPIRIN 81 MG: 81 TABLET, COATED ORAL at 09:30

## 2021-09-19 RX ADMIN — TAMSULOSIN HYDROCHLORIDE 0.4 MG: 0.4 CAPSULE ORAL at 20:25

## 2021-09-19 RX ADMIN — QUETIAPINE FUMARATE 125 MG: 100 TABLET ORAL at 20:25

## 2021-09-19 RX ADMIN — ACETAMINOPHEN 1000 MG: 500 TABLET, FILM COATED ORAL at 16:44

## 2021-09-19 RX ADMIN — ACETAMINOPHEN 1000 MG: 500 TABLET, FILM COATED ORAL at 20:25

## 2021-09-19 NOTE — PROGRESS NOTES
Orthopedic Progress Note      Patient: Alexys Freeman  YOB: 1924     Date of Admission: 9/18/2021 12:23 AM Medical Record Number: [id number]     Attending Physician: Emmy Browne MD    Status Post:  Procedure(s):  HIP TROCHANTERIC NAILING WITH INTRAMEDULLARY HIP SCREW Post Operative Day Number: 1    Subjective : No new orthopaedic complaints     Pain Relief: some relief with present medication.     Systemic Complaints: No Complaints  Vitals:    09/19/21 1049 09/19/21 1116 09/19/21 1206 09/19/21 1221   BP: 90/54 (!) 88/51 (!) 84/45 109/45   BP Location: Left arm Left arm     Patient Position: Lying Lying     Pulse: 70 76 72 80   Resp: 16 16 18 18   Temp: 97.7 °F (36.5 °C) 98.3 °F (36.8 °C) 97.3 °F (36.3 °C) 97.7 °F (36.5 °C)   TempSrc: Oral Oral  Axillary   SpO2: 98% 99% 100% 100%   Weight:       Height:           Physical Exam: 89 y.o. male    General Appearance:       Alert, cooperative, in no acute distress                  Extremities:    Dressing Clean, Dry and Intact, mild spotting proximal              No clinical sign of DVT        Able to do good movements of digits    Pulses:   Pulses palpable and equal bilaterally           Diagnostic Tests:     Results from last 7 days   Lab Units 09/19/21  0902 09/18/21  0147   WBC 10*3/mm3 13.75* 12.20*   HEMOGLOBIN g/dL 6.5* 8.8*   HEMATOCRIT % 21.1* 28.0*   PLATELETS 10*3/mm3 267 349     Results from last 7 days   Lab Units 09/19/21  0902 09/18/21  1013 09/18/21  0147   SODIUM mmol/L 137  --  139   POTASSIUM mmol/L 4.2 4.0 4.5   CHLORIDE mmol/L 103  --  104   CO2 mmol/L 22.0  --  22.0   BUN mg/dL 38*  --  36*   CREATININE mg/dL 1.26  --  1.12   GLUCOSE mg/dL 167*  --  129*   CALCIUM mg/dL 8.2  --  9.0     Results from last 7 days   Lab Units 09/18/21  1013   INR  1.26*   APTT seconds 28.8     No results found for: URICACID  No results found for: CRYSTAL  Microbiology Results (last 10 days)     Procedure Component Value - Date/Time    Urine Culture -  Urine, Urine, Clean Catch [[id number]]  (Abnormal) Collected: 09/18/21 0447    Lab Status: Preliminary result Specimen: Urine, Clean Catch Updated: 09/19/21 1146     Urine Culture 50,000 CFU/mL Gram Negative Bacilli    COVID PRE-OP / PRE-PROCEDURE SCREENING ORDER (NO ISOLATION) - Swab, Nasopharynx [[id number]]  (Normal) Collected: 09/18/21 0231    Lab Status: Final result Specimen: Swab from Nasopharynx Updated: 09/18/21 0428    Narrative:      The following orders were created for panel order COVID PRE-OP / PRE-PROCEDURE SCREENING ORDER (NO ISOLATION) - Swab, Nasopharynx.  Procedure                               Abnormality         Status                     ---------                               -----------         ------                     COVID-19, ABBOTT IN-HOUS...[[id number]]  Normal              Final result                 Please view results for these tests on the individual orders.    COVID-19, ABBOTT IN-HOUSE,NASAL Swab (NO TRANSPORT MEDIA) 2 HR TAT - Swab, Nasopharynx [[id number]]  (Normal) Collected: 09/18/21 0231    Lab Status: Final result Specimen: Swab from Nasopharynx Updated: 09/18/21 0428     COVID19 Presumptive Negative    Narrative:      Fact sheet for providers: https://www.fda.gov/media/852258/download     Fact sheet for patients: https://www.fda.gov/media/700658/download    Test performed by PCR.  If inconsistent with clinical signs and symptoms patient should be tested with different authorized molecular test.        CT Head Without Contrast    Result Date: 9/18/2021  Senescent changes without acute abnormality. Signer Name: Axel Hernandez MD  Signed: 9/18/2021 1:32 AM  Workstation Name: [email or web address]    XR Chest 1 View    Result Date: 9/18/2021  No acute cardiopulmonary findings. Signer Name: Axel Hernandez MD  Signed: 9/18/2021 2:16 AM  Workstation Name: [id number]    FL C Arm During Surgery    Result Date:  9/18/2021  Fluoroscopy provided during ORIF right hip.        XR Hip With or Without Pelvis 2 - 3 View Right    Result Date: 9/19/2021  Expected postoperative changes of ORIF of the right hip.   DICTATED:   09/18/2021 EDITED/ls :   09/19/2021      XR Hip With or Without Pelvis 2 - 3 View Right    Result Date: 9/18/2021  Acute intertrochanteric right proximal femur fracture. Signer Name: Axel Hernandez MD  Signed: 9/18/2021 1:57 AM  Workstation Name: [id number]        Current Medications:  Scheduled Meds:acetaminophen, 1,000 mg, Oral, Q8H  aspirin, 81 mg, Oral, Q12H  atorvastatin, 20 mg, Oral, Nightly  melatonin, 5 mg, Oral, Nightly  palliative care oral rinse, 5 mL, Mouth/Throat, Q6H  pantoprazole, 40 mg, Oral, Daily  PARoxetine, 20 mg, Oral, QAM  QUEtiapine, 125 mg, Oral, Nightly  QUEtiapine, 75 mg, Oral, QAM  sodium chloride, 10 mL, Intravenous, Q12H  tamsulosin, 0.4 mg, Oral, Nightly      Continuous Infusions:lactated ringers, 9 mL/hr  lactated ringers, 100 mL/hr, Last Rate: Stopped (09/19/21 0251)      PRN Meds:.acetaminophen  •  dextrose  •  dextrose  •  docusate sodium  •  glucagon (human recombinant)  •  HYDROcodone-acetaminophen  •  HYDROmorphone **AND** naloxone  •  Morphine  •  ondansetron  •  oxyCODONE  •  oxyCODONE  •  sodium chloride  •  traMADol    Assessment: Status post  No admission procedures for hospital encounter.    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 He should be on chonic abx suppression. He is known to ID.     Weight Bearing: WBAT  Discharge Plan: OK to plan for discharge in  tomorrow to rehab  from orthopaedic perspective.    Maciej Falcon MD    Date: 9/19/2021    Time: 13:27 EDT

## 2021-09-19 NOTE — PROGRESS NOTES
[facility] Medicine Services  PROGRESS NOTE    Patient Name: Alexys Freeman  : 10/17/1924  MRN: [id number]    Date of Admission: 2021  Primary Care Physician: Philip Calixto MD    Subjective   Subjective     CC: Follow-up right hip fracture    HPI:Patient confused overnight, very lethargic this morning    ROS:  UTO sec to mental status    Objective   Objective     Vital Signs:   Temp:  [96.6 °F (35.9 °C)-98.3 °F (36.8 °C)] 97.6 °F (36.4 °C)  Heart Rate:  [49-94] 65  Resp:  [14-18] 16  BP: ()/(39-88) 88/49     Physical Exam:  Constitutional: Chronically ill-appearing elderly male, asleep  HENT: NCAT, mucous membranes moist  Respiratory: Clear to auscultation bilaterally, respiratory effort normal   Cardiovascular: RRR, no murmurs, rubs, or gallops  Gastrointestinal: Positive bowel sounds, soft, nontender, nondistended  Musculoskeletal: No bilateral ankle edema  Psychiatric: SUSHANT  Neurologic: SUSHANT  Skin: No rashes    Results Reviewed:  LAB RESULTS:      Lab 21  1013 21  0147   WBC  --  12.20*   HEMOGLOBIN  --  8.8*   HEMATOCRIT  --  28.0*   PLATELETS  --  349   NEUTROS ABS  --  8.21*   IMMATURE GRANS (ABS)  --  0.05   LYMPHS ABS  --  2.46   MONOS ABS  --  0.78   EOS ABS  --  0.65*   MCV  --  91.8   PROCALCITONIN  --  0.07   PROTIME 15.4*  --    APTT 28.8  --          Lab 21  1013 21  0147   SODIUM  --  139   POTASSIUM 4.0 4.5   CHLORIDE  --  104   CO2  --  22.0   ANION GAP  --  13.0   BUN  --  36*   CREATININE  --  1.12   GLUCOSE  --  129*   CALCIUM  --  9.0         Lab 21  0147   TOTAL PROTEIN 6.9   ALBUMIN 3.60   GLOBULIN 3.3   ALT (SGPT) 15   AST (SGOT) 20   BILIRUBIN 0.2   ALK PHOS 113         Lab 21  1013   PROTIME 15.4*   INR 1.26*             Lab 21  1013   ABO TYPING A   RH TYPING Positive   ANTIBODY SCREEN Negative         Brief Urine Lab Results  (Last result in the past 365 days)      Color   Clarity   Blood   Leuk Est    Nitrite   Protein   CREAT   Urine HCG        09/18/21 0447 Yellow Clear Trace Moderate (2+) Negative 30 mg/dL (1+)               Microbiology Results Abnormal     Procedure Component Value - Date/Time    COVID PRE-OP / PRE-PROCEDURE SCREENING ORDER (NO ISOLATION) - Swab, Nasopharynx [[id number]]  (Normal) Collected: 09/18/21 0231    Lab Status: Final result Specimen: Swab from Nasopharynx Updated: 09/18/21 0428    Narrative:      The following orders were created for panel order COVID PRE-OP / PRE-PROCEDURE SCREENING ORDER (NO ISOLATION) - Swab, Nasopharynx.  Procedure                               Abnormality         Status                     ---------                               -----------         ------                     COVID-19, ABBOTT IN-HOUS...[[id number]]  Normal              Final result                 Please view results for these tests on the individual orders.    COVID-19, ABBOTT IN-HOUSE,NASAL Swab (NO TRANSPORT MEDIA) 2 HR TAT - Swab, Nasopharynx [[id number]]  (Normal) Collected: 09/18/21 0231    Lab Status: Final result Specimen: Swab from Nasopharynx Updated: 09/18/21 0428     COVID19 Presumptive Negative    Narrative:      Fact sheet for providers: https://www.fda.gov/media/089176/download     Fact sheet for patients: https://www.fda.gov/media/799085/download    Test performed by PCR.  If inconsistent with clinical signs and symptoms patient should be tested with different authorized molecular test.          CT Head Without Contrast    Result Date: 9/18/2021  CT Head WO HISTORY: Fall today. TECHNIQUE: Axial unenhanced head CT with multiplanar reformats. Radiation dose reduction techniques included automated exposure control or exposure modulation based on body size. Count of known CT and cardiac nuc med studies performed in previous 12 months: 2. COMPARISON: 12/21/2020 FINDINGS: Ventricular size and configuration are normal. No acute infarct or hemorrhage is identified. There are no masses.  There is no skull fracture.  There is atrophy with advanced chronic small vessel ischemic disease in the white matter. There is an old lacunar infarct in the left corona radiata. Atherosclerotic calcifications are noted in the carotid siphons and distal vertebral arteries.     Impression: Senescent changes without acute abnormality. Signer Name: Axel Hernandez MD  Signed: 9/18/2021 1:32 AM  Workstation Name: [facility]  Radiology [facility]    XR Chest 1 View    Result Date: 9/18/2021  CR Chest 1 Vw INDICATION: Fall. Hip fracture. COMPARISON:  12/16/2020 FINDINGS: Portable AP view(s) of the chest.  The heart and mediastinal contours are normal. The lungs are clear. No pneumothorax or pleural effusion. Atherosclerotic calcifications are noted in the aorta.     Impression: No acute cardiopulmonary findings. Signer Name: Axel Hernandez MD  Signed: 9/18/2021 2:16 AM  Workstation Name: [facility]  [facility]iology [facility]    FL C Arm During Surgery    Result Date: 9/18/2021  EXAMINATION: FL C ARM DURING SURGERY-  INDICATION: HIP TROCHANTERIC NAILING WITH INTRAMEDULLARY HIP SCREW; S72.141A-Displaced intertrochanteric fracture of right femur, initial encounter for closed fracture; Z86.59-Personal history of other mental and behavioral disorders  TECHNIQUE:  COMPARISON: NONE  FINDINGS: A total of one minute and 21 seconds of fluoroscopy time was used. AP and lateral views of the right hip show trochanteric nail placement, and anatomic alignment. Please see the procedure report for full details.       Impression: Fluoroscopy provided during ORIF right hip.        XR Hip With or Without Pelvis 2 - 3 View Right    Result Date: 9/18/2021  EXAMINATION: XR HIP W OR WO PELVIS 2-3 VIEW RIGHT-  INDICATION: Post-Op Hip Arthroplasty; S72.141A-Displaced intertrochanteric fracture of right femur, initial encounter for closed fracture; Z86.59-Personal history of other mental and behavioral disorders   COMPARISON: AP pelvis and right lateral hip of same date  FINDINGS: Femoral nail and interlocking screws are now seen with reduction of the intertrochanteric fracture site. Lesser trochanter avulsion is again noted. No new fractures identified. There is expected postop soft tissue air.       Impression: Expected postoperative changes of ORIF of the right hip.        XR Hip With or Without Pelvis 2 - 3 View Right    Result Date: 9/18/2021  CR Hip Uni Comp Min 2 Vws RT INDICATION: Hip pain after fall. COMPARISON: Right femur radiographs 10/31/2018 FINDINGS: AP pelvis and 2 view(s) of the right hip.  There is an acute comminuted intertrochanteric fracture of the right proximal femur. No hip dislocation is identified. Patient is osteopenic. There is no sacroiliac joint diastasis. No additional fractures are seen. Note is made of atherosclerotic disease.      Impression: Acute intertrochanteric right proximal femur fracture. Signer Name: Axel Hernandez MD  Signed: 9/18/2021 1:57 AM  Workstation Name: [facility]  [facility]      Results for orders placed during the hospital encounter of 12/16/20    Transthoracic Echo Complete With Contrast if Necessary Per Protocol    Interpretation Summary  · Left ventricular ejection fraction appears to be 51 - 55%. Left ventricular systolic function is low normal.  · Left ventricular diastolic function is consistent with (grade Ia w/high LAP) impaired relaxation.  · Estimated right ventricular systolic pressure from tricuspid regurgitation is normal (<35 mmHg). Calculated right ventricular systolic pressure from tricuspid regurgitation is 33 mmHg.      I have reviewed the medications:  Scheduled Meds:acetaminophen, 1,000 mg, Oral, Q8H  aspirin, 81 mg, Oral, Q12H  atorvastatin, 20 mg, Oral, Nightly  lisinopril, 5 mg, Oral, Daily  melatonin, 5 mg, Oral, Nightly  metoprolol tartrate, 12.5 mg, Oral, Q12H  palliative care oral rinse, 5 mL, Mouth/Throat,  Q6H  pantoprazole, 40 mg, Oral, Daily  PARoxetine, 20 mg, Oral, QAM  QUEtiapine, 125 mg, Oral, Nightly  QUEtiapine, 75 mg, Oral, QAM  sodium chloride, 10 mL, Intravenous, Q12H  tamsulosin, 0.4 mg, Oral, Nightly  [MAR Hold] Tranexamic Acid-NaCl, 1,000 mg, Intravenous, BID      Continuous Infusions:lactated ringers, 9 mL/hr  lactated ringers, 100 mL/hr, Last Rate: Stopped (09/19/21 0251)      PRN Meds:.acetaminophen  •  dextrose  •  dextrose  •  docusate sodium  •  glucagon (human recombinant)  •  HYDROcodone-acetaminophen  •  HYDROmorphone **AND** naloxone  •  Morphine  •  ondansetron  •  oxyCODONE  •  oxyCODONE  •  sodium chloride  •  traMADol    Assessment/Plan   Assessment & Plan     Active Hospital Problems    Diagnosis  POA   • Closed right hip fracture (CMS/HCC) [S72.001A]  Yes   • Anxiety disorder [F41.9]  Unknown   • BPH without obstruction/lower urinary tract symptoms [N40.0]  Unknown   • Dementia without behavioral disturbance (CMS/HCC) [F03.90]  Unknown   • Diastolic CHF, chronic (CMS/HCC) [I50.32]  Unknown   • History of myocardial infarction [I25.2]  Not Applicable   • Leukocytosis [D72.829]  Unknown   • Anemia [D64.9]  Yes   • Diabetes mellitus (CMS/HCC) [E11.9]  Yes   • Essential hypertension [I10]  Unknown      Resolved Hospital Problems   No resolved problems to display.        Brief Hospital Course to date:  89-year-old male with history of dementia, chronic diastolic heart failure, type 2 diabetes, hypertension, BPH.  Presented to the ED with right hip pain s/p fall, found to have right hip fracture.Patient is s/p surgery early this morning, he is currently comfortable, daughter is at bedside.     Closed right intratrochanteric fracture of the proximal femur s/p fall  -Ortho consulted, he is s/p repair, continue post-op care per Dr. Falcon  -Continue pain control, PT/OT to evaluate when appropriate     Hypertension  -BP currently low, hold lisinopril and metoprolol  -Give 500 mL normal saline  bolus    Previously well-controlled type 2 diabetes with A1c 6.2% (12/16/2021)  -FSBG's have been reviewed and currently appropriate, continue SSI     Dementia superimposed on hospital delirium  Anxiety and depression  -at risk for hospital delirium  -Continue home Seroquel  -Continue Paxil    Hyperlipidemia-continue statin    BPH-Flomax    Leukocytosis-mild, suspect this is reactive secondary to above    DVT prophylaxis:  Mechanical DVT prophylaxis orders are present.     AM-PAC 6 Clicks Score (PT): 10 (09/18/21 1610)    Disposition: TBD    Addendum:  Anemia, suspect this post-op blood loss. We will send for iron profile and transfuse 2 units PRBC today. Continue to monitor H/H    CODE STATUS:   Code Status and Medical Interventions:   Ordered at: 09/18/21 9505     Limited Support to NOT Include:    Intubation     Code Status:    No CPR     Medical Interventions (Level of Support Prior to Arrest):    Limited       Emmy Browne MD  09/19/21

## 2021-09-19 NOTE — SIGNIFICANT NOTE
One soft wrist restraint required to keep arm straight for blood transfusion. Unable to gain IV access other than AC. Will remove restraint when blood transfusion is complete.

## 2021-09-19 NOTE — PLAN OF CARE
Problem: Fall Injury Risk  Goal: Absence of Fall and Fall-Related Injury  Intervention: Promote Injury-Free Environment  Recent Flowsheet Documentation  Taken 9/19/2021 1600 by Sukhjinder Sandhu, RN  Safety Promotion/Fall Prevention:   activity supervised   assistive device/personal items within reach   clutter free environment maintained   gait belt   fall prevention program maintained   elopement precautions   lighting adjusted   mobility aid in reach   muscle strengthening facilitated   nonskid shoes/slippers when out of bed   room organization consistent   safety round/check completed  Taken 9/19/2021 1400 by Sukhjinder Sandhu, RN  Safety Promotion/Fall Prevention:   activity supervised   assistive device/personal items within reach   clutter free environment maintained   elopement precautions   gait belt   lighting adjusted   mobility aid in reach   fall prevention program maintained   nonskid shoes/slippers when out of bed   muscle strengthening facilitated   room organization consistent   safety round/check completed  Taken 9/19/2021 1200 by Sukhjinder Sandhu, RN  Safety Promotion/Fall Prevention:   activity supervised   assistive device/personal items within reach   clutter free environment maintained   gait belt   lighting adjusted   mobility aid in reach   elopement precautions   fall prevention program maintained   muscle strengthening facilitated   safety round/check completed   nonskid shoes/slippers when out of bed   room organization consistent  Taken 9/19/2021 1000 by Sukhjinder Sandhu, RN  Safety Promotion/Fall Prevention:   activity supervised   assistive device/personal items within reach   clutter free environment maintained   elopement precautions   fall prevention program maintained   gait belt   lighting adjusted   mobility aid in reach   muscle strengthening facilitated   nonskid shoes/slippers when out of bed   room organization consistent   safety round/check completed  Taken  9/19/2021 0800 by Sukhjinder Sandhu, RN  Safety Promotion/Fall Prevention:   activity supervised   assistive device/personal items within reach   clutter free environment maintained   elopement precautions   fall prevention program maintained   gait belt   lighting adjusted   mobility aid in reach   muscle strengthening facilitated   nonskid shoes/slippers when out of bed   safety round/check completed   room organization consistent   Goal Outcome Evaluation:      VSS. Pt confused and oriented only to self. Pt HGB came back critical at 6.5. Two units of blood transfused this shift. One soft wrist restraint required to keep arm straight for blood transfusion. Will continue to monitor.

## 2021-09-19 NOTE — PLAN OF CARE
Patient confused x4, difficulty following instructions, unable to ambulate this shift. Able to lift RLE on own. Denies pain, scheduled tylenol given. Right knee swollen, pt refusing elevation/ice.     Patient due to void. Bladder scan 303ml at @0500. Pt refusing IVF and tele. Encouraged increased PO intake. Denehy and hospitalist following. AM labs pending.      Goal Outcome Evaluation:

## 2021-09-19 NOTE — THERAPY TREATMENT NOTE
Patient Name: Alexys Freeman  : 10/17/1924    MRN: [id number]                              Today's Date: 2021       Admit Date: 2021    Visit Dx:     ICD-10-CM ICD-9-CM   1. Closed displaced intertrochanteric fracture of right femur, initial encounter (CMS/HCC)  S72.141A 820.21   2. History of dementia  Z86.59 V11.8     Patient Active Problem List   Diagnosis   • Diabetes mellitus (CMS/[facility], located within [facility])   • Essential hypertension   • LBBB (left bundle branch block)   • Metabolic encephalopathy   • Anemia   • Closed right hip fracture (CMS/[facility], located within [facility])   • Anxiety disorder   • BPH without obstruction/lower urinary tract symptoms   • Dementia without behavioral disturbance (CMS/[facility], located within [facility])   • Diastolic CHF, chronic (CMS/[facility], located within [facility])   • History of myocardial infarction   • Leukocytosis     Past Medical History:   Diagnosis Date   • Anxiety    • BPH (benign prostatic hyperplasia)    • CHF (congestive heart failure) (CMS/[facility], located within [facility])    • Dementia (CMS/HCC)    • Depression    • Diabetes mellitus (CMS/HCC)    • Hyperlipidemia    • Hypertension    • Myocardial infarction (CMS/[facility], located [facility])    • UTI (urinary tract infection)      Past Surgical History:   Procedure Laterality Date   • KIDNEY STONE SURGERY     • REPLACEMENT TOTAL KNEE BILATERAL       General Information     Row Name 21          Physical Therapy Time and Intention    Document Type  therapy note (daily note)  -     Mode of Treatment  physical therapy  -     Row Name 21          General Information    Patient Profile Reviewed  yes  -     Existing Precautions/Restrictions  fall  -     Row Name 21          Cognition    Orientation Status (Cognition)  oriented to;person;disoriented to;place;situation;time  -     Row Name 21          Safety Issues, Functional Mobility    Safety Issues Affecting Function (Mobility)  safety precaution awareness;safety precautions follow-through/compliance;sequencing abilities;insight into deficits/self-awareness;ability  to follow commands;judgment;awareness of need for assistance  -     Impairments Affecting Function (Mobility)  balance;cognition;endurance/activity tolerance;range of motion (ROM);pain  -     Cognitive Impairments, Mobility Safety/Performance  attention;awareness, need for assistance;impulsivity;insight into deficits/self-awareness;judgment;problem-solving/reasoning;safety precaution awareness;safety precaution follow-through;sequencing abilities  -     Comment, Safety Issues/Impairments (Mobility)  Pt fearful of falling sitting at EOB holding bed rails. Tries to climb out of bed.  -       User Key  (r) = Recorded By, (t) = Taken By, (c) = Cosigned By    Initials Name Provider Type     Antwon Rush, PT Physical Therapist        Mobility     Row Name 09/19/21 0921          Bed Mobility    Bed Mobility  supine-sit;scooting/bridging  -     Scooting/Bridging [place] (Bed Mobility)  minimum assist (75% patient effort);2 person assist;verbal cues;nonverbal cues (demo/gesture)  -     Supine-Sit [place] (Bed Mobility)  minimum assist (75% patient effort);2 person assist;verbal cues;nonverbal cues (demo/gesture)  -     Assistive Device (Bed Mobility)  draw sheet;head of bed elevated;bed rails  -     Comment (Bed Mobility)  VCs for sequencing to EOB and pt showing much improved ability able to complete with min a x 2 at BLE and posterior trunk.  -     Row Name 09/19/21 0921          Transfers    Comment (Transfers)  Deferrerd this date as pt kept trying to lay down once sitting EOB due to fear of falling forward. He tries to climb out of bed and not appropriate to leave up in recliner without constant supervision.  -     Row Name 09/19/21 0921          Mobility    Extremity Weight-bearing Status  right lower extremity  -     Right Lower Extremity (Weight-bearing Status)  weight-bearing as tolerated (WBAT)  -       User Key  (r) = Recorded By, (t) = Taken By, (c) = Cosigned By    Initials  Name Provider Type     Antwon Rush PT Physical Therapist        Obj/Interventions     Row Name 09/19/21 0923          Motor Skills    Therapeutic Exercise  hip;knee;ankle;other (see comments) no dislocation precautions  -     Row Name 09/19/21 0923          Hip (Therapeutic Exercise)    Hip (Therapeutic Exercise)  AROM (active range of motion)  -     Hip AROM (Therapeutic Exercise)  bilateral;external rotation;internal rotation;10 repetitions  -[facility] Name 09/19/21 0923          Knee (Therapeutic Exercise)    Knee (Therapeutic Exercise)  isometric exercises;strengthening exercise  -     Knee Isometrics (Therapeutic Exercise)  bilateral;quad sets;10 repetitions  -     Knee Strengthening (Therapeutic Exercise)  bilateral;SLR (straight leg raise);LAQ (long arc quad);10 repetitions  -[facility] Name 09/19/21 0923          Ankle (Therapeutic Exercise)    Ankle (Therapeutic Exercise)  AROM (active range of motion)  -     Ankle AROM (Therapeutic Exercise)  bilateral;dorsiflexion;plantarflexion;10 repetitions;2 sets  -[facility] Name 09/19/21 0923          Balance    Balance Assessment  sitting static balance;sitting dynamic balance  -     Static Sitting Balance  WFL  -     Dynamic Sitting Balance  mild impairment  -     Balance Interventions  sitting;standing;sit to stand;supported;static;dynamic;minimal challenge  -     Comment, Balance  pt able to perform LAQ sitting EOB without LOB but then gets fearful of fallling at EOB prior to attempt to stand and holds to bedrail with both hands and would not let go, decided to defer standing this date  -       User Key  (r) = Recorded By, (t) = Taken By, (c) = Cosigned By    Initials Name Provider Type     Antwon Rush PT Physical Therapist        Goals/Plan    No documentation.       Clinical Impression     Row Name 09/19/21 0926 09/19/21 0925       Pain    Additional Documentation  Pain Scale: Numbers Pre/Post-Treatment (Group)  -  Pain  Scale: Numbers Pre/Post-Treatment (Group)  -    Row Name 09/19/21 0926          Pain Scale: Numbers Pre/Post-Treatment    Pretreatment Pain Rating  0/10 - no pain  -     Posttreatment Pain Rating  0/10 - no pain  -     Row Name 09/19/21 0926          Plan of Care Review    Plan of Care Reviewed With  patient  -     Progress  improving  -     Outcome Summary  Pt performed bed mobility with min A x 2. pt able to perform ther ex supine and sitting EOB without LOB but then gets fearful of fallling at EOB prior to attempt to stand and holds to bedrail with both hands and would not let go, decided to defer standing/transfers this date. Continue d/c recs for SNF.  -     Row Name 09/19/21 0926          Therapy Assessment/Plan (PT)    Rehab Potential (PT)  good, to achieve stated therapy goals  -     Criteria for Skilled Interventions Met (PT)  yes  -     Row Name 09/19/21 0926          Vital Signs    Pre Systolic BP Rehab  -- VSS, RN cleared for treatment  -     Pre Patient Position  Supine  -     Intra Patient Position  Standing  -     Post Patient Position  Supine  -     Row Name 09/19/21 0926          Positioning and Restraints    Pre-Treatment Position  in bed  -     Post Treatment Position  bed  -     In Bed  notified nsg;fowlers;call light within reach;encouraged to call for assist;exit alarm on;SCD pump applied couch to right side, all side rails up, bed alarm and wall alarm in place, table over pt's lap, he was trying to push it away upon exit as other staff in room  -       User Key  (r) = Recorded By, (t) = Taken By, (c) = Cosigned By    Initials Name Provider Type     Antwon Rush, PT Physical Therapist        Outcome Measures     Row Name 09/19/21 0928          How much help from another person do you currently need...    Turning from your back to your side while in flat bed without using bedrails?  3  -     Moving from lying on back to sitting on the side of a flat bed  without bedrails?  3  -JH     Moving to and from a bed to a chair (including a wheelchair)?  2  -JH     Standing up from a chair using your arms (e.g., wheelchair, bedside chair)?  2  -JH     Climbing 3-5 steps with a railing?  1  -JH     To walk in hospital room?  1  -     AM-PAC 6 Clicks Score (PT)  12  -     Row Name 09/19/21 0928          Functional Assessment    Outcome Measure Options  AM-PAC 6 Clicks Basic Mobility (PT)  -       User Key  (r) = Recorded By, (t) = Taken By, (c) = Cosigned By    Initials Name Provider Type    Antwon Maxwell, PT Physical Therapist                       Physical Therapy Education                 Title: PT OT SLP Therapies (In Progress)     Topic: Physical Therapy (In Progress)     Point: Mobility training (In Progress)     Learning Progress Summary           Patient Acceptance, E,TB, NL by  at 9/19/2021 0929    Comment: edu to pt on safety with mobility    Acceptance, E, NR by KM at 9/18/2021 1611    Comment: discussed plan of care, ability to put wt on leg, discussed situation                   Point: Home exercise program (In Progress)     Learning Progress Summary           Patient Acceptance, E,TB, NL by  at 9/19/2021 0929    Comment: edu to pt on safety with mobility    Acceptance, E, NR by KM at 9/18/2021 1611    Comment: discussed plan of care, ability to put wt on leg, discussed situation                   Point: Body mechanics (In Progress)     Learning Progress Summary           Patient Acceptance, E,TB, NL by  at 9/19/2021 0929    Comment: edu to pt on safety with mobility    Acceptance, E, NR by KM at 9/18/2021 1611    Comment: discussed plan of care, ability to put wt on leg, discussed situation                   Point: Precautions (In Progress)     Learning Progress Summary           Patient Acceptance, E,TB, NL by  at 9/19/2021 0929    Comment: edu to pt on safety with mobility    Acceptance, E, NR by KM at 9/18/2021 1611    Comment: discussed plan  of care, ability to put wt on leg, discussed situation                               User Key     Initials Effective Dates Name Provider Type Discipline     06/16/21 -  Jennyfer Moody, PT Physical Therapist PT     09/22/20 -  Antwon Rush PT Physical Therapist PT              PT Recommendation and Plan     Plan of Care Reviewed With: patient  Progress: improving  Outcome Summary: Pt performed bed mobility with min A x 2. pt able to perform ther ex supine and sitting EOB without LOB but then gets fearful of fallling at EOB prior to attempt to stand and holds to bedrail with both hands and would not let go, decided to defer standing/transfers this date. Continue d/c recs for SNF.     Time Calculation:   PT Charges     Row Name 09/19/21 0929             Time Calculation    Start Time  0843  -      PT Received On  09/19/21  -      PT Goal Re-Cert Due Date  09/28/21  -         Time Calculation- PT    Total Timed Code Minutes- PT  23 minute(s)  -         Timed Charges    86353 - PT Therapeutic Exercise Minutes  8  -      28136 - PT Therapeutic Activity Minutes  15  -         Total Minutes    Timed Charges Total Minutes  23  -       Total Minutes  23  -        User Key  (r) = Recorded By, (t) = Taken By, (c) = Cosigned By    Initials Name Provider Type     Antwon Rush, PT Physical Therapist        Therapy Charges for Today     Code Description Service Date Service Provider Modifiers Qty    33119161103 HC PT THER PROC EA 15 MIN 9/19/2021 Antwon Rush, PT GP 1    03046869198 HC PT THERAPEUTIC ACT EA 15 MIN 9/19/2021 Antwon Rush, PT GP 1    27383000763 HC PT THER SUPP EA 15 MIN 9/19/2021 Antwon Rush, PT GP 2          PT G-Codes  Outcome Measure Options: AM-PAC 6 Clicks Basic Mobility (PT)  AM-PAC 6 Clicks Score (PT): 12    Antwon Rush PT  9/19/2021

## 2021-09-20 LAB
ANION GAP SERPL CALCULATED.3IONS-SCNC: 10 MMOL/L (ref 5–15)
BACTERIA SPEC AEROBE CULT: ABNORMAL
BASOPHILS # BLD AUTO: 0.02 10*3/MM3 (ref 0–0.2)
BASOPHILS NFR BLD AUTO: 0.2 % (ref 0–1.5)
BH BB BLOOD EXPIRATION DATE: NORMAL
BH BB BLOOD EXPIRATION DATE: NORMAL
BH BB BLOOD TYPE BARCODE: 6200
BH BB BLOOD TYPE BARCODE: 6200
BH BB DISPENSE STATUS: NORMAL
BH BB DISPENSE STATUS: NORMAL
BH BB PRODUCT CODE: NORMAL
BH BB PRODUCT CODE: NORMAL
BH BB UNIT NUMBER: NORMAL
BH BB UNIT NUMBER: NORMAL
BUN SERPL-MCNC: 30 MG/DL (ref 8–23)
BUN/CREAT SERPL: 29.4 (ref 7–25)
CALCIUM SPEC-SCNC: 8.1 MG/DL (ref 8.2–9.6)
CHLORIDE SERPL-SCNC: 105 MMOL/L (ref 98–107)
CO2 SERPL-SCNC: 22 MMOL/L (ref 22–29)
CREAT SERPL-MCNC: 1.02 MG/DL (ref 0.76–1.27)
CROSSMATCH INTERPRETATION: NORMAL
CROSSMATCH INTERPRETATION: NORMAL
DEPRECATED RDW RBC AUTO: 58.4 FL (ref 37–54)
EOSINOPHIL # BLD AUTO: 0.44 10*3/MM3 (ref 0–0.4)
EOSINOPHIL NFR BLD AUTO: 4.5 % (ref 0.3–6.2)
ERYTHROCYTE [DISTWIDTH] IN BLOOD BY AUTOMATED COUNT: 18.1 % (ref 12.3–15.4)
GFR SERPL CREATININE-BSD FRML MDRD: 68 ML/MIN/1.73
GLUCOSE BLDC GLUCOMTR-MCNC: 125 MG/DL (ref 70–130)
GLUCOSE BLDC GLUCOMTR-MCNC: 138 MG/DL (ref 70–130)
GLUCOSE BLDC GLUCOMTR-MCNC: 142 MG/DL (ref 70–130)
GLUCOSE BLDC GLUCOMTR-MCNC: 196 MG/DL (ref 70–130)
GLUCOSE SERPL-MCNC: 129 MG/DL (ref 65–99)
HCT VFR BLD AUTO: 27.4 % (ref 37.5–51)
HGB BLD-MCNC: 9 G/DL (ref 13–17.7)
IMM GRANULOCYTES # BLD AUTO: 0.04 10*3/MM3 (ref 0–0.05)
IMM GRANULOCYTES NFR BLD AUTO: 0.4 % (ref 0–0.5)
LYMPHOCYTES # BLD AUTO: 1.4 10*3/MM3 (ref 0.7–3.1)
LYMPHOCYTES NFR BLD AUTO: 14.3 % (ref 19.6–45.3)
MCH RBC QN AUTO: 29.3 PG (ref 26.6–33)
MCHC RBC AUTO-ENTMCNC: 32.8 G/DL (ref 31.5–35.7)
MCV RBC AUTO: 89.3 FL (ref 79–97)
MONOCYTES # BLD AUTO: 0.77 10*3/MM3 (ref 0.1–0.9)
MONOCYTES NFR BLD AUTO: 7.9 % (ref 5–12)
NEUTROPHILS NFR BLD AUTO: 7.09 10*3/MM3 (ref 1.7–7)
NEUTROPHILS NFR BLD AUTO: 72.7 % (ref 42.7–76)
NRBC BLD AUTO-RTO: 0 /100 WBC (ref 0–0.2)
PLATELET # BLD AUTO: 220 10*3/MM3 (ref 140–450)
PMV BLD AUTO: 10.3 FL (ref 6–12)
POTASSIUM SERPL-SCNC: 4.1 MMOL/L (ref 3.5–5.2)
RBC # BLD AUTO: 3.07 10*6/MM3 (ref 4.14–5.8)
SARS-COV-2 RNA PNL SPEC NAA+PROBE: NOT DETECTED
SODIUM SERPL-SCNC: 137 MMOL/L (ref 136–145)
UNIT  ABO: NORMAL
UNIT  ABO: NORMAL
UNIT  RH: NORMAL
UNIT  RH: NORMAL
WBC # BLD AUTO: 9.76 10*3/MM3 (ref 3.4–10.8)

## 2021-09-20 PROCEDURE — 85025 COMPLETE CBC W/AUTO DIFF WBC: CPT | Performed by: ORTHOPAEDIC SURGERY

## 2021-09-20 PROCEDURE — 99232 SBSQ HOSP IP/OBS MODERATE 35: CPT | Performed by: INTERNAL MEDICINE

## 2021-09-20 PROCEDURE — 80048 BASIC METABOLIC PNL TOTAL CA: CPT | Performed by: ORTHOPAEDIC SURGERY

## 2021-09-20 PROCEDURE — U0004 COV-19 TEST NON-CDC HGH THRU: HCPCS | Performed by: INTERNAL MEDICINE

## 2021-09-20 PROCEDURE — U0005 INFEC AGEN DETEC AMPLI PROBE: HCPCS | Performed by: INTERNAL MEDICINE

## 2021-09-20 PROCEDURE — 82962 GLUCOSE BLOOD TEST: CPT

## 2021-09-20 PROCEDURE — 97530 THERAPEUTIC ACTIVITIES: CPT

## 2021-09-20 PROCEDURE — 97110 THERAPEUTIC EXERCISES: CPT

## 2021-09-20 RX ADMIN — Medication 5 MG: at 19:59

## 2021-09-20 RX ADMIN — QUETIAPINE FUMARATE 125 MG: 100 TABLET ORAL at 19:59

## 2021-09-20 RX ADMIN — TAMSULOSIN HYDROCHLORIDE 0.4 MG: 0.4 CAPSULE ORAL at 19:59

## 2021-09-20 RX ADMIN — MINERAL OIL 5 ML: 1000 LIQUID ORAL at 11:29

## 2021-09-20 RX ADMIN — ACETAMINOPHEN 1000 MG: 500 TABLET, FILM COATED ORAL at 05:20

## 2021-09-20 RX ADMIN — ATORVASTATIN CALCIUM 20 MG: 20 TABLET, FILM COATED ORAL at 19:59

## 2021-09-20 RX ADMIN — ACETAMINOPHEN 1000 MG: 500 TABLET, FILM COATED ORAL at 19:59

## 2021-09-20 RX ADMIN — MINERAL OIL 5 ML: 1000 LIQUID ORAL at 20:40

## 2021-09-20 RX ADMIN — ASPIRIN 81 MG: 81 TABLET, COATED ORAL at 19:59

## 2021-09-20 RX ADMIN — MINERAL OIL 5 ML: 1000 LIQUID ORAL at 16:18

## 2021-09-20 RX ADMIN — SODIUM CHLORIDE, PRESERVATIVE FREE 10 ML: 5 INJECTION INTRAVENOUS at 08:49

## 2021-09-20 RX ADMIN — ASPIRIN 81 MG: 81 TABLET, COATED ORAL at 08:49

## 2021-09-20 RX ADMIN — PANTOPRAZOLE SODIUM 40 MG: 40 TABLET, DELAYED RELEASE ORAL at 08:49

## 2021-09-20 RX ADMIN — ACETAMINOPHEN 1000 MG: 500 TABLET, FILM COATED ORAL at 13:24

## 2021-09-20 RX ADMIN — QUETIAPINE FUMARATE 75 MG: 25 TABLET ORAL at 05:20

## 2021-09-20 RX ADMIN — PAROXETINE HYDROCHLORIDE 20 MG: 20 TABLET, FILM COATED ORAL at 05:20

## 2021-09-20 NOTE — PLAN OF CARE
Problem: Adult Inpatient Plan of Care  Goal: Plan of Care Review  Flowsheets (Taken 9/20/2021 1630)  Progress: improving  Plan of Care Reviewed With: patient  Outcome Summary: Pt performed bed mobility with mod A, STS and stand-pivot transfers from bed to chair performed with mod Ax2 and RW. Pt unable to adequately weight shift to take steps at this time. Reviewed HEP and WB status. Will continue to progress strength and mobility as able. Recommend SNF at d/c.   Goal Outcome Evaluation:  Plan of Care Reviewed With: patient        Progress: improving  Outcome Summary: Pt performed bed mobility with mod A, STS and stand-pivot transfers from bed to chair performed with mod Ax2 and RW. Pt unable to adequately weight shift to take steps at this time. Reviewed HEP and WB status. Will continue to progress strength and mobility as able. Recommend SNF at d/c.

## 2021-09-20 NOTE — PLAN OF CARE
Goal Outcome Evaluation:  Plan of Care Reviewed With: patient        Progress: improving  Outcome Summary: Alert, confused, very pleasant. VSS. Voiding well per urinal. Placed on speciality bed today, ordered by Wound Care. No c/o pain. Will continue to monitor.

## 2021-09-20 NOTE — NURSING NOTE
Wocn consulted for:  Skin tear    Wound presentation: patient has a 1 x 1.6 x 0.2 cm skin tear, on medial left forearm, just below the elbow. Surrounding ecchymosis     Intervention performed: cleansed with NS, covered with xeroform and wrapped with kerlix; change this q3 days NO ADHESIVES    Head to toe Ax performed.    Additional skin issues: patient is frail, advanced age and a broken leg. Patient remains high risk for further breakdown.      Heels offloaded with pillow. Waffle in place. Skin interventions in place.    Specialty bed: VANNA bed ordered due to advanced age and impaired mobility due to broken leg.    Wocn will follow. Please contact with questions or concerns.

## 2021-09-20 NOTE — PLAN OF CARE
Problem: Adult Inpatient Plan of Care  Goal: Plan of Care Review  Outcome: Ongoing, Progressing  Flowsheets (Taken 9/20/2021 1104)  Progress: improving  Plan of Care Reviewed With: patient  Outcome Summary: Alert, confused, very pleasant. VSS. Voiding well per urinal. Placed on speciality bed today, ordered by Wound Care. No c/o pain. Will continue to monitor.   Goal Outcome Evaluation:  Plan of Care Reviewed With: patient        Progress: improving  Outcome Summary: Alert, confused, very pleasant. VSS. Voiding well per urinal. Placed on speciality bed today, ordered by Wound Care. No c/o pain. Will continue to monitor.

## 2021-09-20 NOTE — PLAN OF CARE
Goal Outcome Evaluation:           Progress: improving   VSS, on RA. No c/o pain. Voiding well. Confused but pleasant. Dressing changed. Spit out several meds during evening med pass, nurse unable to identify which meds where taken, took morning med with no problem. Awake most of the shift, short naps taken.

## 2021-09-20 NOTE — PROGRESS NOTES
[facility] Medicine Services  PROGRESS NOTE    Patient Name: Alexys Freeman  : 10/17/1924  MRN: [id number]    Date of Admission: 2021  Primary Care Physician: Philip Calixto MD    Subjective   Subjective     CC: Follow-up right hip fracture    HPI: Patient remains confused, otherwise has no new complaints,    ROS:  Gen- No fevers, chills  CV- No chest pain, palpitations  Resp- No cough, dyspnea  GI- No N/V/D, abd pain    Objective   Objective     Vital Signs:   Temp:  [97.3 °F (36.3 °C)-98.3 °F (36.8 °C)] 98.3 °F (36.8 °C)  Heart Rate:  [65-80] 71  Resp:  [16-18] 16  BP: ()/(45-85) 125/74     Physical Exam:  Constitutional: Chronically ill-appearing elderly male no acute distress, restless  HENT: NCAT, mucous membranes moist  Respiratory: Clear to auscultation bilaterally, respiratory effort normal   Cardiovascular: RRR, no murmurs, rubs, or gallops  Gastrointestinal: Positive bowel sounds, soft, nontender, nondistended  Musculoskeletal: No bilateral ankle edema  Psychiatric: Appropriate affect, cooperative  Neurologic: Pleasantly confused, nonfocal  Skin: No rashes      Results Reviewed:  LAB RESULTS:      Lab 21   WBC  --  13.75*  --  12.20*   HEMOGLOBIN 8.8* 6.5*  --  8.8*   HEMATOCRIT 26.3* 21.1*  --  28.0*   PLATELETS  --  267  --  349   NEUTROS ABS  --  10.03*  --  8.21*   IMMATURE GRANS (ABS)  --  0.05  --  0.05   LYMPHS ABS  --  2.42  --  2.46   MONOS ABS  --  0.87  --  0.78   EOS ABS  --  0.35  --  0.65*   MCV  --  94.6  --  91.8   PROCALCITONIN  --   --   --  0.07   PROTIME  --   --  15.4*  --    APTT  --   --  28.8  --          Lab 21  1013 21  0147   SODIUM 137  --  139   POTASSIUM 4.2 4.0 4.5   CHLORIDE 103  --  104   CO2 22.0  --  22.0   ANION GAP 12.0  --  13.0   BUN 38*  --  36*   CREATININE 1.26  --  1.12   GLUCOSE 167*  --  129*   CALCIUM 8.2  --  9.0         Lab  09/18/21  0147   TOTAL PROTEIN 6.9   ALBUMIN 3.60   GLOBULIN 3.3   ALT (SGPT) 15   AST (SGOT) 20   BILIRUBIN 0.2   ALK PHOS 113         Lab 09/18/21  1013   PROTIME 15.4*   INR 1.26*             Lab 09/18/21  1013   ABO TYPING A   RH TYPING Positive   ANTIBODY SCREEN Negative         Brief Urine Lab Results  (Last result in the past 365 days)      Color   Clarity   Blood   Leuk Est   Nitrite   Protein   CREAT   Urine HCG        09/18/21 0447 Yellow Clear Trace Moderate (2+) Negative 30 mg/dL (1+)               Microbiology Results Abnormal     Procedure Component Value - Date/Time    COVID PRE-OP / PRE-PROCEDURE SCREENING ORDER (NO ISOLATION) - Swab, Nasopharynx [[id number]]  (Normal) Collected: 09/18/21 0231    Lab Status: Final result Specimen: Swab from Nasopharynx Updated: 09/18/21 0428    Narrative:      The following orders were created for panel order COVID PRE-OP / PRE-PROCEDURE SCREENING ORDER (NO ISOLATION) - Swab, Nasopharynx.  Procedure                               Abnormality         Status                     ---------                               -----------         ------                     COVID-19, ABBOTT IN-HOUS...[[id number]]  Normal              Final result                 Please view results for these tests on the individual orders.    COVID-19, ABBOTT IN-HOUSE,NASAL Swab (NO TRANSPORT MEDIA) 2 HR TAT - Swab, Nasopharynx [[id number]]  (Normal) Collected: 09/18/21 0231    Lab Status: Final result Specimen: Swab from Nasopharynx Updated: 09/18/21 0428     COVID19 Presumptive Negative    Narrative:      Fact sheet for providers: https://www.fda.gov/media/205513/download     Fact sheet for patients: https://www.fda.gov/media/202036/download    Test performed by PCR.  If inconsistent with clinical signs and symptoms patient should be tested with different authorized molecular test.          FL C Arm During Surgery    Result Date: 9/19/2021  EXAMINATION: FL C ARM DURING SURGERY - 09/18/2021   INDICATION: ; S72.141A-Displaced intertrochanteric fracture of right femur, initial encounter for closed fracture; Z86.59-Personal history of other mental and behavioral disorders  COMPARISON: None  FINDINGS: A total of one minute and 21 seconds of fluoroscopy time was used. AP and lateral views of the right hip show trochanteric nail placement, in anatomic alignment. Please see the procedure report for full details.      Impression: Fluoroscopy provided during ORIF right hip.  DICTATED:   09/18/2021 EDITED/ls :   09/19/2021   This report was finalized on 9/19/2021 11:05 PM by Dr. Bunny Chaidez MD.      XR Hip With or Without Pelvis 2 - 3 View Right    Result Date: 9/19/2021  EXAMINATION: XR RIGHT HIP W OR WO PELVIS 2-3 VIEWS - 09/18/2021  INDICATION: S72.141A-Displaced intertrochanteric fracture of right femur, initial encounter for closed fracture; Z86.59-Personal history of other mental and behavioral disorders. Post-op right hip arthroplasty.  COMPARISON: AP pelvis and right lateral hip of same date  FINDINGS: Femoral nail and interlocking screws are now seen with reduction of the intertrochanteric fracture site. Lesser trochanter avulsion is again noted. No new fracture Is identified. There is expected postop soft tissue air.      Impression: Expected postoperative changes of ORIF of the right hip.   DICTATED:   09/18/2021 EDITED/ls :   09/19/2021  This report was finalized on 9/19/2021 9:58 PM by Dr. Bunny Chaidez MD.        Results for orders placed during the hospital encounter of 12/16/20    Transthoracic Echo Complete With Contrast if Necessary Per Protocol    Interpretation Summary  · Left ventricular ejection fraction appears to be 51 - 55%. Left ventricular systolic function is low normal.  · Left ventricular diastolic function is consistent with (grade Ia w/high LAP) impaired relaxation.  · Estimated right ventricular systolic pressure from tricuspid regurgitation is normal (<35 mmHg). Calculated right  ventricular systolic pressure from tricuspid regurgitation is 33 mmHg.      I have reviewed the medications:  Scheduled Meds:acetaminophen, 1,000 mg, Oral, Q8H  aspirin, 81 mg, Oral, Q12H  atorvastatin, 20 mg, Oral, Nightly  melatonin, 5 mg, Oral, Nightly  palliative care oral rinse, 5 mL, Mouth/Throat, Q6H  pantoprazole, 40 mg, Oral, Daily  PARoxetine, 20 mg, Oral, QAM  QUEtiapine, 125 mg, Oral, Nightly  QUEtiapine, 75 mg, Oral, QAM  sodium chloride, 10 mL, Intravenous, Q12H  tamsulosin, 0.4 mg, Oral, Nightly      Continuous Infusions:lactated ringers, 9 mL/hr  lactated ringers, 100 mL/hr, Last Rate: Stopped (09/19/21 0251)      PRN Meds:.acetaminophen  •  dextrose  •  dextrose  •  docusate sodium  •  glucagon (human recombinant)  •  HYDROcodone-acetaminophen  •  HYDROmorphone **AND** naloxone  •  Morphine  •  ondansetron  •  oxyCODONE  •  oxyCODONE  •  sodium chloride  •  traMADol    Assessment/Plan   Assessment & Plan     Active Hospital Problems    Diagnosis  POA   • Closed right hip fracture (CMS/HCC) [S72.001A]  Yes   • Anxiety disorder [F41.9]  Unknown   • BPH without obstruction/lower urinary tract symptoms [N40.0]  Unknown   • Dementia without behavioral disturbance (CMS/HCC) [F03.90]  Unknown   • Diastolic CHF, chronic (CMS/HCC) [I50.32]  Unknown   • History of myocardial infarction [I25.2]  Not Applicable   • Leukocytosis [D72.829]  Unknown   • Anemia [D64.9]  Yes   • Diabetes mellitus (CMS/HCC) [E11.9]  Yes   • Essential hypertension [I10]  Unknown      Resolved Hospital Problems   No resolved problems to display.        Brief Hospital Course to date:  89-year-old male with history of dementia, chronic diastolic heart failure, type 2 diabetes, hypertension, BPH.  Presented to the ED with right hip pain s/p fall, found to have right hip fracture.Patient is s/p surgery early this morning, he is currently comfortable, daughter is at bedside.     Closed right intratrochanteric fracture of the proximal  femur s/p fall  -Ortho following he is s/p repair, continue post-op care per Dr. Falcon  -Continue pain control, PT/OT, WBAT    Hypertension  -Patient previously with hypotension, BP currently much improved, will continue to hold lisinopril and metoprolol for now     Previously well-controlled type 2 diabetes with A1c 6.2% (12/16/2021)  -FSBG's have been reviewed and currently appropriate, continue SSI     Dementia superimposed on hospital delirium  Anxiety and depression  -at risk for hospital delirium  -Continue home Seroquel  -Continue Paxil     Hyperlipidemia-continue statin     BPH-Flomax     Right knee infection  -Per ortho, family has elected for nonoperative management and should be on chronic antibiotic suppression, apparently he is known to ID, will ask pharmacy to review med rec and add suppression antibiotic if confirmed.    Leukocytosis-mild, suspect this is reactive secondary to above     DVT prophylaxis:  Mechanical DVT prophylaxis orders are present.       AM-PAC 6 Clicks Score (PT): 12 (09/19/21 2266)    Disposition: TBD    CODE STATUS:   Code Status and Medical Interventions:   Ordered at: 09/18/21 8209     Limited Support to NOT Include:    Intubation     Code Status:    No CPR     Medical Interventions (Level of Support Prior to Arrest):    Limited       Emmy Browne MD  09/20/21

## 2021-09-20 NOTE — THERAPY TREATMENT NOTE
Patient Name: Alexys Freeman  : 10/17/1924    MRN: [id number]                              Today's Date: 2021       Admit Date: 2021    Visit Dx:     ICD-10-CM ICD-9-CM   1. Closed displaced intertrochanteric fracture of right femur, initial encounter (CMS/[facility])  S72.141A 820.21   2. History of dementia  Z86.59 V11.8     Patient Active Problem List   Diagnosis   • Diabetes mellitus (CMS/[facility])   • Essential hypertension   • LBBB (left bundle branch block)   • Metabolic encephalopathy   • Anemia   • Closed right hip fracture (CMS/[facility])   • Anxiety disorder   • BPH without obstruction/lower urinary tract symptoms   • Dementia without behavioral disturbance (CMS/[facility])   • Diastolic CHF, chronic (CMS/[facility])   • History of myocardial infarction   • Leukocytosis     Past Medical History:   Diagnosis Date   • Anxiety    • BPH (benign prostatic hyperplasia)    • CHF (congestive heart failure) (CMS/[facility])    • Dementia (CMS/[facility])    • Depression    • Diabetes mellitus (CMS/[facility])    • Hyperlipidemia    • Hypertension    • Myocardial infarction (CMS/[facility])    • UTI (urinary tract infection)      Past Surgical History:   Procedure Laterality Date   • HIP TROCHANTERIC NAILING WITH INTRAMEDULLARY HIP SCREW Right 2021    Procedure: HIP TROCHANTERIC NAILING WITH INTRAMEDULLARY HIP SCREW RIGHT;  Surgeon: Maciej Falcon MD;  Location: [facility];  Service: Orthopedics;  Laterality: Right;   • KIDNEY STONE SURGERY     • REPLACEMENT TOTAL KNEE BILATERAL       General Information     Row Name 21 163          Physical Therapy Time and Intention    Document Type  therapy note (daily note)  -VENTURA     Mode of Treatment  physical therapy;individual therapy  -     Row Name 21 163          General Information    Existing Precautions/Restrictions  fall  -     Row Name 21 163          Cognition    Orientation Status (Cognition)  oriented to;person;disoriented to;place;situation;time  -     Row Name 21 0001           Safety Issues, Functional Mobility    Safety Issues Affecting Function (Mobility)  ability to follow commands;awareness of need for assistance;insight into deficits/self-awareness;positioning of assistive device;problem-solving;safety precaution awareness;safety precautions follow-through/compliance;sequencing abilities  -     Impairments Affecting Function (Mobility)  balance;cognition;endurance/activity tolerance;range of motion (ROM);pain;strength  -VENTURA     Cognitive Impairments, Mobility Safety/Performance  attention;awareness, need for assistance;insight into deficits/self-awareness;judgment;problem-solving/reasoning;safety precaution awareness;safety precaution follow-through;sequencing abilities  -VENTURA       User Key  (r) = Recorded By, (t) = Taken By, (c) = Cosigned By    Initials Name Provider Type    VENTURA Kelvin Thomason, PT Physical Therapist        Mobility     Row Name 09/20/21 1630          Bed Mobility    Bed Mobility  supine-sit;scooting/bridging  -     Scooting/Bridging Barber (Bed Mobility)  verbal cues;moderate assist (50% patient effort)  -     Supine-Sit Barber (Bed Mobility)  verbal cues;moderate assist (50% patient effort)  -     Assistive Device (Bed Mobility)  draw sheet;head of bed elevated;bed rails  -     Comment (Bed Mobility)  Mod A for LE management off of EOB and trunk control into sitting  -     Row Name 09/20/21 1630          Transfers    Comment (Transfers)  Verbal cues for safe hand placement during standing/sitting and moving R LE out for comfort prior to sitting; stand-pivot transfer from bed to chair performed with mod Ax2 and RW  -     Row Name 09/20/21 1630          Bed-Chair Transfer    Bed-Chair Barber (Transfers)  verbal cues;moderate assist (50% patient effort);2 person assist  -     Assistive Device (Bed-Chair Transfers)  walker, front-wheeled  -     Row Name 09/20/21 1630          Sit-Stand Transfer    Sit-Stand Barber (Transfers)   verbal cues;moderate assist (50% patient effort);2 person assist  -     Assistive Device (Sit-Stand Transfers)  walker, front-wheeled  -     Row Name 09/20/21 1630          Gait/Stairs (Locomotion)    Kershaw Level (Gait)  not tested  -     K[place]haw Level (Stairs)  not tested  -     Comment (Gait/Stairs)  Pt unable to adequately weight shift to take steps at this time.  -     Row Name 09/20/21 1630          Mobility    Extremity Weight-bearing Status  right lower extremity  -     Right Lower Extremity (Weight-bearing Status)  weight-bearing as tolerated (WBAT)  -       User Key  (r) = Recorded By, (t) = Taken By, (c) = Cosigned By    Initials Name Provider Type    [facility] Kelvin Thomason, PT Physical Therapist        Obj/Interventions     Row Name 09/20/21 1630          Motor Skills    Therapeutic Exercise  hip;knee;ankle  -[facility] Name 09/20/21 1630          Hip (Therapeutic Exercise)    Hip (Therapeutic Exercise)  isometric exercises;strengthening exercise  -     Hip Isometrics (Therapeutic Exercise)  gluteal sets;10 repetitions  -     Hip Strengthening (Therapeutic Exercise)  right;aBduction;aDduction;10 repetitions  -[facility] Name 09/20/21 1630          Knee (Therapeutic Exercise)    Knee (Therapeutic Exercise)  isometric exercises;strengthening exercise  -     Knee Isometrics (Therapeutic Exercise)  quad sets;10 repetitions  -     Knee Strengthening (Therapeutic Exercise)  right;heel slides;SLR (straight leg raise);LAQ (long arc quad);10 repetitions  -[facility] Name 09/20/21 1630          Ankle (Therapeutic Exercise)    Ankle (Therapeutic Exercise)  AROM (active range of motion)  -     Ankle AROM (Therapeutic Exercise)  bilateral;plantarflexion;dorsiflexion;10 repetitions  -[facility] Name 09/20/21 1630          Balance    Balance Assessment  sitting static balance;sitting dynamic balance;standing static balance;standing dynamic balance  -     Static Sitting Balance   WFL;supported;sitting, edge of bed  -VENTURA     Dynamic Sitting Balance  mild impairment;supported;sitting, edge of bed  -VENTURA     Static Standing Balance  supported;standing;moderate impairment  -VENTURA     Dynamic Standing Balance  moderate impairment;supported;standing  -VENTURA       User Key  (r) = Recorded By, (t) = Taken By, (c) = Cosigned By    Initials Name Provider Type    Kelvin Woods, PT Physical Therapist        Goals/Plan    No documentation.       Clinical Impression     Row Name 09/20/21 1630          Pain    Additional Documentation  Pain Scale: Numbers Pre/Post-Treatment (Group)  -[facility] Name 09/20/21 1630          Pain Scale: Numbers Pre/Post-Treatment    Pretreatment Pain Rating  0/10 - no pain  -VENTURA     Posttreatment Pain Rating  0/10 - no pain  -VENTURA     Row Name 09/20/21 1630          Therapy Assessment/Plan (PT)    Rehab Potential (PT)  good, to achieve stated therapy goals  -VENTURA     Criteria for Skilled Interventions Met (PT)  meets criteria;skilled treatment is necessary;yes  -[facility] Name 09/20/21 1630          Positioning and Restraints    Pre-Treatment Position  in bed  -VENTURA     Post Treatment Position  chair  -VENTURA     In Chair  notified nsg;reclined;call light within reach;encouraged to call for assist;exit alarm on;legs elevated;compression device;waffle cushion;on mechanical lift sling  -VENTURA       User Key  (r) = Recorded By, (t) = Taken By, (c) = Cosigned By    Initials Name Provider Type    Kelvin Woods, PT Physical Therapist        Outcome Measures     Row Name 09/20/21 1630 09/20/21 0800       How much help from another person do you currently need...    Turning from your back to your side while in flat bed without using bedrails?  2  -VENTURA  3  -AC    Moving from lying on back to sitting on the side of a flat bed without bedrails?  2  -VENUTRA  3  -AC    Moving to and from a bed to a chair (including a wheelchair)?  2  -VENTURA  2  -AC    Standing up from a chair using your arms (e.g., wheelchair, bedside  chair)?  2  -VENTURA  2  -AC    Climbing 3-5 steps with a railing?  1  -VENTURA  1  -AC    To walk in hospital room?  1  -VENTURA  1  -AC    AM-PAC 6 Clicks Score (PT)  10  -VENTURA  12  -AC    Row Name 09/20/21 1630          Functional Assessment    Outcome Measure Options  AM-PAC 6 Clicks Basic Mobility (PT)  -VENTURA       User Key  (r) = Recorded By, (t) = Taken By, (c) = Cosigned By    Initials Name Provider Type    AC Veda Lopez, RN Registered Nurse    Kelvin Woods, PT Physical Therapist                       Physical Therapy Education                 Title: PT OT SLP Therapies (In Progress)     Topic: Physical Therapy (In Progress)     Point: Mobility training (In Progress)     Learning Progress Summary           Patient Acceptance, E,D, NR by VENTURA at 9/20/2021 1630    Comment: Eduated on safe sequencing with bed mobility, ambulatory transfers. Reviewed HEP.    Acceptance, E,TB, NL by  at 9/19/2021 0929    Comment: edu to pt on safety with mobility    Acceptance, E, NR by KM at 9/18/2021 1611    Comment: discussed plan of care, ability to put wt on leg, discussed situation                   Point: Home exercise program (In Progress)     Learning Progress Summary           Patient Acceptance, E,D, NR by VENTURA at 9/20/2021 1630    Comment: Eduated on safe sequencing with bed mobility, ambulatory transfers. Reviewed HEP.    Acceptance, E,TB, NL by  at 9/19/2021 0929    Comment: edu to pt on safety with mobility    Acceptance, E, NR by KM at 9/18/2021 1611    Comment: discussed plan of care, ability to put wt on leg, discussed situation                   Point: Body mechanics (In Progress)     Learning Progress Summary           Patient Acceptance, E,D, NR by VENTURA at 9/20/2021 1630    Comment: Eduated on safe sequencing with bed mobility, ambulatory transfers. Reviewed HEP.    Acceptance, E,TB, NL by  at 9/19/2021 0929    Comment: edu to pt on safety with mobility    Acceptance, E, NR by KM at 9/18/2021 1611    Comment: discussed plan of  care, ability to put wt on leg, discussed situation                   Point: Precautions (In Progress)     Learning Progress Summary           Patient Acceptance, E,D, NR by VENTURA at 9/20/2021 1630    Comment: Eduated on safe sequencing with bed mobility, ambulatory transfers. Reviewed HEP.    Acceptance, E,TB, NL by  at 9/19/2021 0929    Comment: edu to pt on safety with mobility    Acceptance, E, NR by  at 9/18/2021 1611    Comment: discussed plan of care, ability to put wt on leg, discussed situation                               User Key     Initials Effective Dates Name Provider Type Discipline     06/16/21 -  Jennyfer Moody, PT Physical Therapist PT    [facility]URA 06/16/21 -  Kelvin Thomason, PT Physical Therapist PT     09/22/20 -  Antwon Rush, KIMMY Physical Therapist PT              PT Recommendation and Plan     Plan of Care Reviewed With: patient  Progress: improving  Outcome Summary: Pt performed bed mobility with mod A, STS and stand-pivot transfers from bed to chair performed with mod Ax2 and RW. Pt unable to adequately weight shift to take steps at this time. Reviewed HEP and WB status. Will continue to progress strength and mobility as able. Recommend SNF at d/c.     Time Calculation:   PT Charges     Row Name 09/20/21 1630             Time Calculation    Start Time  1630  -VENTURA      PT Received On  09/20/21  -      PT Goal Re-Cert Due Date  09/28/21  -         Time Calculation- PT    Total Timed Code Minutes- PT  25 minute(s)  -VENTURA         Timed Charges    13381 - PT Therapeutic Exercise Minutes  11  -VENTURA      75959 - PT Therapeutic Activity Minutes  14  -VENTURA         Total Minutes    Timed Charges Total Minutes  25  -VENTURA       Total Minutes  25  -VENTURA        User Key  (r) = Recorded By, (t) = Taken By, (c) = Cosigned By    Initials Name Provider Type    Kelvin Woods, PT Physical Therapist        Therapy Charges for Today     Code Description Service Date Service Provider Modifiers Qty    [id number]   PT THER PROC EA 15 MIN 9/20/2021 Kelvin Thomason, PT GP 1    [id number]  PT THERAPEUTIC ACT EA 15 MIN 9/20/2021 Kelvin Thomason, PT GP 1          PT G-Codes  Outcome Measure Options: AM-PAC 6 Clicks Basic Mobility (PT)  AM-PAC 6 Clicks Score (PT): 10    Kelvin Thomason, PT  9/20/2021

## 2021-09-21 VITALS
DIASTOLIC BLOOD PRESSURE: 61 MMHG | RESPIRATION RATE: 16 BRPM | TEMPERATURE: 97.6 F | OXYGEN SATURATION: 100 % | WEIGHT: 126.9 LBS | HEART RATE: 81 BPM | SYSTOLIC BLOOD PRESSURE: 111 MMHG | HEIGHT: 68 IN | BODY MASS INDEX: 19.23 KG/M2

## 2021-09-21 PROBLEM — D72.829 LEUKOCYTOSIS: Status: RESOLVED | Noted: 2021-09-18 | Resolved: 2021-09-21

## 2021-09-21 LAB
ANION GAP SERPL CALCULATED.3IONS-SCNC: 12 MMOL/L (ref 5–15)
BASOPHILS # BLD AUTO: 0.02 10*3/MM3 (ref 0–0.2)
BASOPHILS NFR BLD AUTO: 0.2 % (ref 0–1.5)
BUN SERPL-MCNC: 27 MG/DL (ref 8–23)
BUN/CREAT SERPL: 33.3 (ref 7–25)
CALCIUM SPEC-SCNC: 8.4 MG/DL (ref 8.2–9.6)
CHLORIDE SERPL-SCNC: 105 MMOL/L (ref 98–107)
CO2 SERPL-SCNC: 20 MMOL/L (ref 22–29)
CREAT SERPL-MCNC: 0.81 MG/DL (ref 0.76–1.27)
DEPRECATED RDW RBC AUTO: 63.3 FL (ref 37–54)
EOSINOPHIL # BLD AUTO: 0.45 10*3/MM3 (ref 0–0.4)
EOSINOPHIL NFR BLD AUTO: 5.1 % (ref 0.3–6.2)
ERYTHROCYTE [DISTWIDTH] IN BLOOD BY AUTOMATED COUNT: 18.5 % (ref 12.3–15.4)
GFR SERPL CREATININE-BSD FRML MDRD: 88 ML/MIN/1.73
GLUCOSE BLDC GLUCOMTR-MCNC: 118 MG/DL (ref 70–130)
GLUCOSE BLDC GLUCOMTR-MCNC: 156 MG/DL (ref 70–130)
GLUCOSE SERPL-MCNC: 114 MG/DL (ref 65–99)
HCT VFR BLD AUTO: 30.4 % (ref 37.5–51)
HGB BLD-MCNC: 9.5 G/DL (ref 13–17.7)
IMM GRANULOCYTES # BLD AUTO: 0.04 10*3/MM3 (ref 0–0.05)
IMM GRANULOCYTES NFR BLD AUTO: 0.5 % (ref 0–0.5)
LYMPHOCYTES # BLD AUTO: 2.15 10*3/MM3 (ref 0.7–3.1)
LYMPHOCYTES NFR BLD AUTO: 24.5 % (ref 19.6–45.3)
MCH RBC QN AUTO: 29.7 PG (ref 26.6–33)
MCHC RBC AUTO-ENTMCNC: 31.3 G/DL (ref 31.5–35.7)
MCV RBC AUTO: 95 FL (ref 79–97)
MONOCYTES # BLD AUTO: 0.59 10*3/MM3 (ref 0.1–0.9)
MONOCYTES NFR BLD AUTO: 6.7 % (ref 5–12)
NEUTROPHILS NFR BLD AUTO: 5.54 10*3/MM3 (ref 1.7–7)
NEUTROPHILS NFR BLD AUTO: 63 % (ref 42.7–76)
NRBC BLD AUTO-RTO: 0 /100 WBC (ref 0–0.2)
PLATELET # BLD AUTO: 226 10*3/MM3 (ref 140–450)
PMV BLD AUTO: 10.4 FL (ref 6–12)
POTASSIUM SERPL-SCNC: 4 MMOL/L (ref 3.5–5.2)
RBC # BLD AUTO: 3.2 10*6/MM3 (ref 4.14–5.8)
SODIUM SERPL-SCNC: 137 MMOL/L (ref 136–145)
WBC # BLD AUTO: 8.79 10*3/MM3 (ref 3.4–10.8)

## 2021-09-21 PROCEDURE — 99239 HOSP IP/OBS DSCHRG MGMT >30: CPT | Performed by: PHYSICIAN ASSISTANT

## 2021-09-21 PROCEDURE — 80048 BASIC METABOLIC PNL TOTAL CA: CPT | Performed by: ORTHOPAEDIC SURGERY

## 2021-09-21 PROCEDURE — 85025 COMPLETE CBC W/AUTO DIFF WBC: CPT | Performed by: ORTHOPAEDIC SURGERY

## 2021-09-21 PROCEDURE — 82962 GLUCOSE BLOOD TEST: CPT

## 2021-09-21 RX ORDER — QUETIAPINE FUMARATE 25 MG/1
125 TABLET, FILM COATED ORAL NIGHTLY
Start: 2021-09-21

## 2021-09-21 RX ORDER — BISACODYL 10 MG
10 SUPPOSITORY, RECTAL RECTAL DAILY
Start: 2021-09-22

## 2021-09-21 RX ORDER — BISACODYL 10 MG
10 SUPPOSITORY, RECTAL RECTAL DAILY
Status: DISCONTINUED | OUTPATIENT
Start: 2021-09-21 | End: 2021-09-21 | Stop reason: HOSPADM

## 2021-09-21 RX ORDER — DOXYCYCLINE HYCLATE 100 MG/1
100 CAPSULE ORAL 2 TIMES DAILY
Qty: 60 CAPSULE | Refills: 1 | Status: SHIPPED | OUTPATIENT
Start: 2021-09-21 | End: 2021-10-21

## 2021-09-21 RX ORDER — AMOXICILLIN 250 MG
2 CAPSULE ORAL 2 TIMES DAILY PRN
Status: DISCONTINUED | OUTPATIENT
Start: 2021-09-21 | End: 2021-09-21 | Stop reason: HOSPADM

## 2021-09-21 RX ADMIN — BISACODYL 10 MG: 10 SUPPOSITORY RECTAL at 09:11

## 2021-09-21 RX ADMIN — MINERAL OIL 5 ML: 1000 LIQUID ORAL at 08:51

## 2021-09-21 RX ADMIN — DOCUSATE SODIUM 100 MG: 100 CAPSULE, LIQUID FILLED ORAL at 06:30

## 2021-09-21 RX ADMIN — DOCUSATE SODIUM 50 MG AND SENNOSIDES 8.6 MG 2 TABLET: 8.6; 5 TABLET, FILM COATED ORAL at 09:11

## 2021-09-21 RX ADMIN — ASPIRIN 81 MG: 81 TABLET, COATED ORAL at 08:51

## 2021-09-21 RX ADMIN — ACETAMINOPHEN 1000 MG: 500 TABLET, FILM COATED ORAL at 05:19

## 2021-09-21 RX ADMIN — QUETIAPINE FUMARATE 75 MG: 25 TABLET ORAL at 05:18

## 2021-09-21 RX ADMIN — PANTOPRAZOLE SODIUM 40 MG: 40 TABLET, DELAYED RELEASE ORAL at 08:51

## 2021-09-21 RX ADMIN — PAROXETINE HYDROCHLORIDE 20 MG: 20 TABLET, FILM COATED ORAL at 05:19

## 2021-09-21 NOTE — DISCHARGE SUMMARY
[facility] Medicine Services  DISCHARGE SUMMARY    Patient Name: Alexys Freeman  : 10/17/1924  MRN: [id number]    Date of Admission: 2021 12:23 AM  Date of Discharge:  2021  Primary Care Physician: Philip Calixto MD    Consults     Date and Time Order Name Status Description    2021  2:17 AM Inpatient Orthopedic Surgery Consult Completed           Hospital Course     Presenting Problem:   Closed right hip fracture (CMS/HCC) [S72.001A]    Active Hospital Problems    Diagnosis  POA   • Closed right hip fracture (CMS/HCC) [S72.001A]  Yes   • Anxiety disorder [F41.9]  Yes   • BPH without obstruction/lower urinary tract symptoms [N40.0]  Yes   • Dementia without behavioral disturbance (CMS/HCC) [F03.90]  Yes   • Diastolic CHF, chronic (CMS/HCC) [I50.32]  Yes   • History of myocardial infarction [I25.2]  Not Applicable   • Anemia [D64.9]  Yes   • Diabetes mellitus (CMS/HCC) [E11.9]  Yes   • Essential hypertension [I10]  Yes      Resolved Hospital Problems    Diagnosis Date Resolved POA   • Leukocytosis [D72.829] 2021 Yes          Hospital Course:  Alexys Freeman is a 89 y.o. male with history of dementia, chronic diastolic heart failure, type 2 diabetes, hypertension, BPH.  Presented to the ED with right hip pain s/p fall, found to have right hip fracture.  The patient underwent hip trochanteric nailing with intramedullary hip screw on 2021 with Dr. Booth.     Closed right intratrochanteric fracture of the proximal femur s/p fall  - Ortho following he is s/p repair, continue post-op care per Dr. Falcon.  - Continue pain control, PT/OT, WBAT.     Hypertension  -Patient previously with hypotension, BP currently much improved, will continue to hold lisinopril and metoprolol for now.      Previously well-controlled type 2 diabetes with A1c 6.2% (2021)  - The patient was treated with sliding scale insulin here.     Dementia superimposed on hospital  delirium  Anxiety and depression  - At risk for hospital delirium.  - Continue home Seroquel.  - Continue Paxil.     Hyperlipidemia  - Continue statin therapy.     BPH  - Continue Flomax.     Right knee infection  -Per ortho, family has elected for nonoperative management and should be on chronic antibiotic suppression, apparently he is known to ID, will ask pharmacy to review med rec and add suppression antibiotic if confirmed.  - According to daughter, Ana, the patient was currently on Doxycycline 100mg BID prior to admission.  He has an appointment with Infectious Disease in November.  They had discussed tapering down to once a day.      Leukocytosis  - Mild, suspect this is reactive secondary to above.       Discharge Follow Up Recommendations for outpatient labs/diagnostics:  Follow up with Infectious disease as scheduled.  Follow up with Dr. Falcon.    Day of Discharge     HPI:   Mr. Freeman is a bit confused today.  He requested another warm blanket.  We discussed his discharge plan to return to [place].    Review of Systems  Unable to obtain.    Vital Signs:   Temp:  [97.5 °F (36.4 °C)-98 °F (36.7 °C)] 97.5 °F (36.4 °C)  Heart Rate:  [70-79] 70  Resp:  [16-18] 16  BP: ()/(52-69) 145/69     Physical Exam:  Constitutional: No acute distress, awake, alert  HENT: NCAT, mucous membranes moist  Respiratory: Clear to auscultation bilaterally, respiratory effort normal   Cardiovascular: RRR, no murmurs, rubs, or gallops  Gastrointestinal: Positive bowel sounds, soft, nontender, nondistended  Psychiatric: Appropriate affect, cooperative  Neurologic: able to answer simple questioning  Skin: No rashes      Pertinent  and/or Most Recent Results     LAB RESULTS:      Lab 09/21/21  0604 09/20/21  0807 09/19/21  1928 09/19/21  0902 09/18/21  1013 09/18/21  0147   WBC 8.79 9.76  --  13.75*  --  12.20*   HEMOGLOBIN 9.5* 9.0* 8.8* 6.5*  --  8.8*   HEMATOCRIT 30.4* 27.4* 26.3* 21.1*  --  28.0*   PLATELETS 226 220  --  267   --  349   NEUTROS ABS 5.54 7.09*  --  10.03*  --  8.21*   IMMATURE GRANS (ABS) 0.04 0.04  --  0.05  --  0.05   LYMPHS ABS 2.15 1.40  --  2.42  --  2.46   MONOS ABS 0.59 0.77  --  0.87  --  0.78   EOS ABS 0.45* 0.44*  --  0.35  --  0.65*   MCV 95.0 89.3  --  94.6  --  91.8   PROCALCITONIN  --   --   --   --   --  0.07   PROTIME  --   --   --   --  15.4*  --    APTT  --   --   --   --  28.8  --          Lab 09/21/21  0604 09/20/21  0807 09/19/21  0902 09/18/21  1013 09/18/21  0147   SODIUM 137 137 137  --  139   POTASSIUM 4.0 4.1 4.2 4.0 4.5   CHLORIDE 105 105 103  --  104   CO2 20.0* 22.0 22.0  --  22.0   ANION GAP 12.0 10.0 12.0  --  13.0   BUN 27* 30* 38*  --  36*   CREATININE 0.81 1.02 1.26  --  1.12   GLUCOSE 114* 129* 167*  --  129*   CALCIUM 8.4 8.1* 8.2  --  9.0         Lab 09/18/21  0147   TOTAL PROTEIN 6.9   ALBUMIN 3.60   GLOBULIN 3.3   ALT (SGPT) 15   AST (SGOT) 20   BILIRUBIN 0.2   ALK PHOS 113         Lab 09/18/21  1013   PROTIME 15.4*   INR 1.26*             Lab 09/18/21  1013   ABO TYPING A   RH TYPING Positive   ANTIBODY SCREEN Negative         Brief Urine Lab Results  (Last result in the past 365 days)      Color   Clarity   Blood   Leuk Est   Nitrite   Protein   CREAT   Urine HCG        09/18/21 0447 Yellow Clear Trace Moderate (2+) Negative 30 mg/dL (1+)             Microbiology Results (last 10 days)     Procedure Component Value - Date/Time    COVID-19, APTIMA PANTHER [facility] IN-HOUSE NP/OP SWAB IN UTM/VTM/SALINE TRANSPORT MEDIA 24HR TAT - Swab, Nasopharynx [[id number]]  (Normal) Collected: 09/20/21 1716    Lab Status: Final result Specimen: Swab from Nasopharynx Updated: 09/20/21 9425     COVID19 Not Detected    Narrative:      Fact sheet for providers: https://www.fda.gov/media/563989/download     Fact sheet for patients: https://www.fda.gov/media/818753/download    Test performed by RT PCR.    Urine Culture - Urine, Urine, Clean Catch [[id number]]  (Abnormal)  (Susceptibility) Collected: 09/18/21  0447    Lab Status: Final result Specimen: Urine, Clean Catch Updated: 09/20/21 0930     Urine Culture 50,000 CFU/mL Citrobacter koseri    Susceptibility      Citrobacter koseri      LG      Cefazolin Susceptible      Cefepime Susceptible      Ceftazidime Susceptible      Ceftriaxone Susceptible      Gentamicin Susceptible      Levofloxacin Susceptible      Nitrofurantoin Susceptible      Piperacillin + Tazobactam Susceptible      Tetracycline Susceptible      Trimethoprim + Sulfamethoxazole Susceptible               Linear View                   COVID PRE-OP / PRE-PROCEDURE SCREENING ORDER (NO ISOLATION) - Swab, Nasopharynx [[id number]]  (Normal) Collected: 09/18/21 0231    Lab Status: Final result Specimen: Swab from Nasopharynx Updated: 09/18/21 0428    Narrative:      The following orders were created for panel order COVID PRE-OP / PRE-PROCEDURE SCREENING ORDER (NO ISOLATION) - Swab, Nasopharynx.  Procedure                               Abnormality         Status                     ---------                               -----------         ------                     COVID-19, [facility] IN-HOUS...[[id number]]  Normal              Final result                 Please view results for these tests on the individual orders.    COVID-19, [facility]BBOTT IN-HOUSE,NASAL Swab (NO TRANSPORT MEDIA) 2 HR TAT - Swab, Nasopharynx [[id number]]  (Normal) Collected: 09/18/21 0231    Lab Status: Final result Specimen: Swab from Nasopharynx Updated: 09/18/21 0428     COVID19 Presumptive Negative    Narrative:      Fact sheet for providers: https://www.fda.gov/media/959830/download     Fact sheet for patients: https://www.fda.gov/media/443407/download    Test performed by PCR.  If inconsistent with clinical signs and symptoms patient should be tested with different authorized molecular test.          CT Head Without Contrast    Result Date: 9/18/2021  CT Head WO HISTORY: Fall today. TECHNIQUE: Axial unenhanced head CT with multiplanar  reformats. Radiation dose reduction techniques included automated exposure control or exposure modulation based on body size. Count of known CT and cardiac nuc med studies performed in previous 12 months: 2. COMPARISON: 12/21/2020 FINDINGS: Ventricular size and configuration are normal. No acute infarct or hemorrhage is identified. There are no masses. There is no skull fracture.  There is atrophy with advanced chronic small vessel ischemic disease in the white matter. There is an old lacunar infarct in the left corona radiata. Atherosclerotic calcifications are noted in the carotid siphons and distal vertebral arteries.     Senescent changes without acute abnormality. Signer Name: Axel Hernandez MD  Signed: 9/18/2021 1:32 AM  Workstation Name: [facility]  Radiology [facility]    XR Chest 1 View    Result Date: 9/18/2021  CR Chest 1 Vw INDICATION: Fall. Hip fracture. COMPARISON:  12/16/2020 FINDINGS: Portable AP view(s) of the chest.  The heart and mediastinal contours are normal. The lungs are clear. No pneumothorax or pleural effusion. Atherosclerotic calcifications are noted in the aorta.     No acute cardiopulmonary findings. Signer Name: Axel Hernandez MD  Signed: 9/18/2021 2:16 AM  Workstation Name: [facility]  Radiology [facility]    FL C Arm During Surgery    Result Date: 9/19/2021  EXAMINATION: FL C ARM DURING SURGERY - 09/18/2021  INDICATION: ; S72.141A-Displaced intertrochanteric fracture of right femur, initial encounter for closed fracture; Z86.59-Personal history of other mental and behavioral disorders  COMPARISON: None  FINDINGS: A total of one minute and 21 seconds of fluoroscopy time was used. AP and lateral views of the right hip show trochanteric nail placement, in anatomic alignment. Please see the procedure report for full details.      Fluoroscopy provided during ORIF right hip.  DICTATED:   09/18/2021 EDITED/ls :   09/19/2021   This report was finalized on  9/19/2021 11:05 PM by Dr. Bunny Chaidez MD.      XR Hip With or Without Pelvis 2 - 3 View Right    Result Date: 9/19/2021  EXAMINATION: XR RIGHT HIP W OR WO PELVIS 2-3 VIEWS - 09/18/2021  INDICATION: S72.141A-Displaced intertrochanteric fracture of right femur, initial encounter for closed fracture; Z86.59-Personal history of other mental and behavioral disorders. Post-op right hip arthroplasty.  COMPARISON: AP pelvis and right lateral hip of same date  FINDINGS: Femoral nail and interlocking screws are now seen with reduction of the intertrochanteric fracture site. Lesser trochanter avulsion is again noted. No new fracture Is identified. There is expected postop soft tissue air.      Expected postoperative changes of ORIF of the right hip.   DICTATED:   09/18/2021 EDITED/ls :   09/19/2021  This report was finalized on 9/19/2021 9:58 PM by Dr. Bunny Chaidez MD.      XR Hip With or Without Pelvis 2 - 3 View Right    Result Date: 9/18/2021  CR Hip Uni Comp Min 2 Vws RT INDICATION: Hip pain after fall. COMPARISON: Right femur radiographs 10/31/2018 FINDINGS: AP pelvis and 2 view(s) of the right hip.  There is an acute comminuted intertrochanteric fracture of the right proximal femur. No hip dislocation is identified. Patient is osteopenic. There is no sacroiliac joint diastasis. No additional fractures are seen. Note is made of atherosclerotic disease.      Acute intertrochanteric right proximal femur fracture. Signer Name: Axel Hernandez MD  Signed: 9/18/2021 1:57 AM  Workstation Name: [facility]  [facility]              Results for orders placed during the hospital encounter of 12/16/20    Transthoracic Echo Complete With Contrast if Necessary Per Protocol    Interpretation Summary  · Left ventricular ejection fraction appears to be 51 - 55%. Left ventricular systolic function is low normal.  · Left ventricular diastolic function is consistent with (grade Ia w/high LAP) impaired relaxation.  ·  Estimated right ventricular systolic pressure from tricuspid regurgitation is normal (<35 mmHg). Calculated right ventricular systolic pressure from tricuspid regurgitation is 33 mmHg.      Plan for Follow-up of Pending Labs/Results:     Discharge Details        Discharge Medications      New Medications      Instructions Start Date   bisacodyl 10 MG suppository  Commonly known as: DULCOLAX   10 mg, Rectal, Daily   Start Date: September 22, 2021     doxycycline 100 MG capsule  Commonly known as: VIBRAMYCIN   100 mg, Oral, 2 Times Daily         Changes to Medications      Instructions Start Date   QUEtiapine 25 MG tablet  Commonly known as: SEROquel  What changed:   · Another medication with the same name was changed. Make sure you understand how and when to take each.  · Another medication with the same name was removed. Continue taking this medication, and follow the directions you see here.   75 mg, Oral, Every Morning      QUEtiapine 25 MG tablet  Commonly known as: SEROquel  What changed:   · when to take this  · Another medication with the same name was removed. Continue taking this medication, and follow the directions you see here.   125 mg, Oral, Nightly         Continue These Medications      Instructions Start Date   acetaminophen 325 MG tablet  Commonly known as: TYLENOL   650 mg, Oral, Every 4 Hours PRN      aspirin 81 MG chewable tablet   81 mg, Oral, Daily      atorvastatin 20 MG tablet  Commonly known as: LIPITOR   20 mg, Oral, Nightly      cetirizine 10 MG tablet  Commonly known as: zyrTEC   10 mg, Oral, Daily PRN      docusate sodium 100 MG capsule   100 mg, Oral, 2 Times Daily PRN      HYDROcodone-acetaminophen 5-325 MG per tablet  Commonly known as: NORCO   1 tablet, Oral, Every 6 Hours PRN      melatonin 5 MG tablet tablet   5 mg, Oral, Nightly      metFORMIN  MG 24 hr tablet  Commonly known as: GLUCOPHAGE-XR   500 mg, Oral, Daily With Breakfast      PALLIATIVE CARE ORAL RINSE (BRYANT)   5 mL,  Mouth/Throat, Every 6 Hours      pantoprazole 40 MG EC tablet  Commonly known as: PROTONIX   40 mg, Oral, Daily      PARoxetine 20 MG tablet  Commonly known as: PAXIL   20 mg, Oral, Every Morning      tamsulosin 0.4 MG capsule 24 hr capsule  Commonly known as: FLOMAX   1 capsule, Oral, Nightly         Stop These Medications    lisinopril 5 MG tablet  Commonly known as: PRINIVIL,ZESTRIL     metoprolol tartrate 25 MG tablet  Commonly known as: LOPRESSOR     nitroglycerin 0.4 MG SL tablet  Commonly known as: NITROSTAT            No Known Allergies      Discharge Disposition:  Home or Self Care    Diet:  Hospital:  Diet Order   Procedures   • Diet Regular       Activity:  Activity Instructions     WEIGHT BEARING AS TOLERATED TO RIGHT LOWER EXTREMITY            Restrictions or Other Recommendations:  none       CODE STATUS:    Code Status and Medical Interventions:   Ordered at: 09/18/21 1458     Limited Support to NOT Include:    Intubation     Code Status:    No CPR     Medical Interventions (Level of Support Prior to Arrest):    Limited       No future appointments.    Additional Instructions for the Follow-ups that You Need to Schedule     Discharge Follow-up with Specified Provider: Follow up with PCP   As directed      To: Follow up with PCP         Discharge Follow-up with Specified Provider: Recommend follow up with Infectious disease to discuss need for suppressive antibiotics.   As directed      To: Recommend follow up with Infectious disease to discuss need for suppressive antibiotics.                     Puja Jones PA-C  09/21/21      Time Spent on Discharge:  I spent 45 minutes on this discharge activity which included: face-to-face encounter with the patient, reviewing the data in the system, coordination of the care with the nursing staff as well as consultants, documentation, and entering orders.

## 2021-09-21 NOTE — PLAN OF CARE
Problem: Fall Injury Risk  Goal: Absence of Fall and Fall-Related Injury  Outcome: Adequate for Care Transition  Intervention: Identify and Manage Contributors to Fall Injury Risk  Recent Flowsheet Documentation  Taken 9/21/2021 0845 by Leonarda Pederson RN  Medication Review/Management: medications reviewed  Intervention: Promote Injury-Free Environment  Recent Flowsheet Documentation  Taken 9/21/2021 1200 by Leonarda Pederson, RN  Safety Promotion/Fall Prevention:   activity supervised   assistive device/personal items within reach   clutter free environment maintained   fall prevention program maintained   gait belt   toileting scheduled   safety round/check completed   room organization consistent   nonskid shoes/slippers when out of bed  Taken 9/21/2021 1015 by Leonarda Pederson, RN  Safety Promotion/Fall Prevention:   activity supervised   assistive device/personal items within reach   clutter free environment maintained   fall prevention program maintained   gait belt   toileting scheduled   safety round/check completed   room organization consistent   nonskid shoes/slippers when out of bed  Taken 9/21/2021 0845 by Leonarda Pederson RN  Safety Promotion/Fall Prevention:   activity supervised   assistive device/personal items within reach   clutter free environment maintained   fall prevention program maintained   gait belt   toileting scheduled   safety round/check completed   room organization consistent   nonskid shoes/slippers when out of bed     Problem: Skin Injury Risk Increased  Goal: Skin Health and Integrity  Outcome: Adequate for Care Transition  Intervention: Optimize Skin Protection  Recent Flowsheet Documentation  Taken 9/21/2021 1200 by Leonarda Pederson, RN  Pressure Reduction Techniques:   frequent weight shift encouraged   weight shift assistance provided  Head of Bed (HOB): HOB at 30-45 degrees  Pressure Reduction Devices: specialty bed utilized  Skin Protection:   adhesive use limited    transparent dressing maintained   tubing/devices free from skin contact  Taken 9/21/2021 1015 by Leonarda Pederson, RN  Pressure Reduction Techniques:   frequent weight shift encouraged   weight shift assistance provided  Head of Bed (HOB): HOB at 45 degrees  Pressure Reduction Devices: specialty bed utilized  Skin Protection:   adhesive use limited   transparent dressing maintained   tubing/devices free from skin contact  Taken 9/21/2021 0845 by Leonarda Pederson, RN  Pressure Reduction Techniques:   frequent weight shift encouraged   positioned off wounds   weight shift assistance provided  Head of Bed (HOB): HOB at 60 degrees  Pressure Reduction Devices:   specialty bed utilized   positioning supports utilized  Skin Protection:   adhesive use limited   transparent dressing maintained   tubing/devices free from skin contact     Problem: Adult Inpatient Plan of Care  Goal: Plan of Care Review  Outcome: Adequate for Care Transition  Goal: Patient-Specific Goal (Individualized)  Outcome: Adequate for Care Transition  Goal: Absence of Hospital-Acquired Illness or Injury  Outcome: Adequate for Care Transition  Intervention: Identify and Manage Fall Risk  Recent Flowsheet Documentation  Taken 9/21/2021 1200 by Leonarda Pederson, RN  Safety Promotion/Fall Prevention:   activity supervised   assistive device/personal items within reach   clutter free environment maintained   fall prevention program maintained   gait belt   toileting scheduled   safety round/check completed   room organization consistent   nonskid shoes/slippers when out of bed  Taken 9/21/2021 1015 by Leonarda Pederson, RN  Safety Promotion/Fall Prevention:   activity supervised   assistive device/personal items within reach   clutter free environment maintained   fall prevention program maintained   gait belt   toileting scheduled   safety round/check completed   room organization consistent   nonskid shoes/slippers when out of bed  Taken 9/21/2021 0845 by  Leonarda Pederson, RN  Safety Promotion/Fall Prevention:   activity supervised   assistive device/personal items within reach   clutter free environment maintained   fall prevention program maintained   gait belt   toileting scheduled   safety round/check completed   room organization consistent   nonskid shoes/slippers when out of bed  Intervention: Prevent Skin Injury  Recent Flowsheet Documentation  Taken 9/21/2021 1200 by Leonarda Pederson RN  Body Position: supine  Skin Protection:   adhesive use limited   transparent dressing maintained   tubing/devices free from skin contact  Taken 9/21/2021 1015 by Leonarda Pederson RN  Body Position: side-lying, right  Skin Protection:   adhesive use limited   transparent dressing maintained   tubing/devices free from skin contact  Taken 9/21/2021 0845 by Leonarda Pederson RN  Body Position: supine  Skin Protection:   adhesive use limited   transparent dressing maintained   tubing/devices free from skin contact  Intervention: Prevent and Manage VTE (venous thromboembolism) Risk  Recent Flowsheet Documentation  Taken 9/21/2021 1200 by Leonarda Pederson RN  VTE Prevention/Management:   bilateral   sequential compression devices on  Taken 9/21/2021 1015 by Leonarda Pederson RN  VTE Prevention/Management:   bilateral   sequential compression devices on  Taken 9/21/2021 0845 by Leonarda Pederson RN  VTE Prevention/Management:   bilateral   sequential compression devices on  Intervention: Prevent Infection  Recent Flowsheet Documentation  Taken 9/21/2021 1200 by Leonarda Pederson RN  Infection Prevention: environmental surveillance performed  Taken 9/21/2021 1015 by Leonarda Pederson RN  Infection Prevention: environmental surveillance performed  Taken 9/21/2021 0845 by Leonarda Pederson RN  Infection Prevention: environmental surveillance performed  Goal: Optimal Comfort and Wellbeing  Outcome: Adequate for Care Transition  Intervention: Provide Person-Centered Care  Recent Flowsheet  Documentation  Taken 9/21/2021 1200 by Leonarda Pederson, RN  Trust Relationship/Rapport: care explained  Taken 9/21/2021 1015 by Leonarda Pederson, RN  Trust Relationship/Rapport:   care explained   choices provided   questions encouraged   questions answered   reassurance provided   thoughts/feelings acknowledged  Taken 9/21/2021 0845 by Leonarda Pederson, RN  Trust Relationship/Rapport:   care explained   choices provided   questions encouraged   questions answered   reassurance provided   thoughts/feelings acknowledged  Goal: Readiness for Transition of Care  Outcome: Adequate for Care Transition   Goal Outcome Evaluation:

## 2021-09-21 NOTE — CASE MANAGEMENT/SOCIAL WORK
Case Management Discharge Note      Final Note: Per Nohemy at [facility], they can accept Mr Freeman into a skilled bed today. [facility] will transport at [facility]N. Daughter ( Ana) is in agreement with plan. Call report to [phone]         Selected Continued Care - Admitted Since 9/18/2021     Destination Coordination complete.    Service Provider Selected Services Address Phone Fax Patient Preferred    [facility]  Skilled Nursing [address] [facility] [address] [phone] --          Durable Medical Equipment    No services have been selected for the patient.              Dialysis/Infusion    No services have been selected for the patient.              Home Medical Care    No services have been selected for the patient.              Therapy    No services have been selected for the patient.              Community Resources    No services have been selected for the patient.              Community & DME    No services have been selected for the patient.                  Transportation Services  Ambulance: [facility]    Final Discharge Disposition Code: 03 - skilled nursing facility (SNF)

## 2021-09-21 NOTE — CASE MANAGEMENT/SOCIAL WORK
Continued Stay Note  [facility]     Patient Name: Alexys Freeman  MRN: [id number]  Today's Date: 9/21/2021    Admit Date: 9/18/2021    Discharge Plan     Row Name 09/21/21 0822       Plan    Plan  [facility]    Plan Comments  Chart reviewed. I spoke wiht daughter by phone on 9/20. Mr Freeman resides in a long term bed at [facility] ( pvt pay). His bed is on hold and he can return to the skilled level of care if medically ready and neg Covid test. [facility] ambulance is scheduled to transport at 1215. Daughter in agreement with plan        Discharge Codes    No documentation.       Expected Discharge Date and Time     Expected Discharge Date Expected Discharge Time    Sep 23, 2021             Sonja C Kellerman, RN

## 2021-09-21 NOTE — PLAN OF CARE
Goal Outcome Evaluation:           Progress: improving   VSS, on RA. Pain only apparent when rolling in bed. Awake most of the night. Pleasantly confused. Voiding well. Dressing is dry and intact.
